# Patient Record
Sex: FEMALE | Race: WHITE | Employment: UNEMPLOYED | URBAN - METROPOLITAN AREA
[De-identification: names, ages, dates, MRNs, and addresses within clinical notes are randomized per-mention and may not be internally consistent; named-entity substitution may affect disease eponyms.]

---

## 2018-04-09 ENCOUNTER — TELEPHONE (OUTPATIENT)
Dept: FAMILY MEDICINE CLINIC | Age: 58
End: 2018-04-09

## 2020-07-08 ENCOUNTER — TELEPHONE (OUTPATIENT)
Dept: NON INVASIVE DIAGNOSTICS | Age: 60
End: 2020-07-08

## 2020-07-08 NOTE — TELEPHONE ENCOUNTER
Spoke with patient to schedule Tilt Test, test is on 07/31/2020 at main with [facility] at 10:30. Patient is to arrive at 9:30, she is to have  both with a mask. Nothing to eat or drink after midnight. No medications to hold morning of test. She is also going to [facility] on 07/27/2020 for COVID testing. She verbalized understanding and instructions also mailed.

## 2020-07-27 ENCOUNTER — HOSPITAL ENCOUNTER (OUTPATIENT)
Age: 60
Discharge: HOME | End: 2020-07-29
Payer: COMMERCIAL

## 2020-07-27 PROCEDURE — U0003 INFECTIOUS AGENT DETECTION BY NUCLEIC ACID (DNA OR RNA); SEVERE ACUTE RESPIRATORY SYNDROME CORONAVIRUS 2 (SARS-COV-2) (CORONAVIRUS DISEASE [COVID-19]), AMPLIFIED PROBE TECHNIQUE, MAKING USE OF HIGH THROUGHPUT TECHNOLOGIES AS DESCRIBED BY CMS-2020-01-R: HCPCS

## 2020-07-28 LAB
SARS-COV-2: NOT DETECTED
SOURCE: NORMAL

## 2020-07-31 ENCOUNTER — HOSPITAL ENCOUNTER (OUTPATIENT)
Dept: CARDIAC CATH/INVASIVE PROCEDURES | Age: 60
Discharge: HOME | End: 2020-07-31
Payer: COMMERCIAL

## 2020-07-31 VITALS
TEMPERATURE: 97.7 F | BODY MASS INDEX: 24.75 KG/M2 | WEIGHT: 145 LBS | OXYGEN SATURATION: 98 % | DIASTOLIC BLOOD PRESSURE: 139 MMHG | HEIGHT: 64 IN | SYSTOLIC BLOOD PRESSURE: 186 MMHG | HEART RATE: 79 BPM

## 2020-07-31 PROCEDURE — 93660 TILT TABLE EVALUATION: CPT | Performed by: INTERNAL MEDICINE

## 2020-07-31 PROCEDURE — 2580000003 HC RX 258: Performed by: INTERNAL MEDICINE

## 2020-07-31 RX ORDER — SODIUM CHLORIDE 9 MG/ML
INJECTION, SOLUTION INTRAVENOUS ONCE
Status: COMPLETED | OUTPATIENT
Start: 2020-07-31 | End: 2020-07-31

## 2020-07-31 RX ORDER — VILAZODONE HYDROCHLORIDE 20 MG/1
20 TABLET ORAL DAILY
COMMUNITY

## 2020-07-31 RX ORDER — FOLIC ACID 1 MG/1
1 TABLET ORAL DAILY
COMMUNITY

## 2020-07-31 RX ADMIN — SODIUM CHLORIDE: 9 INJECTION, SOLUTION INTRAVENOUS at 10:29

## 2020-11-25 ENCOUNTER — APPOINTMENT (OUTPATIENT)
Dept: GENERAL RADIOLOGY | Age: 60
DRG: 563 | End: 2020-11-25
Payer: COMMERCIAL

## 2020-11-25 ENCOUNTER — HOSPITAL ENCOUNTER (INPATIENT)
Age: 60
LOS: 3 days | Discharge: HOME HEALTH | DRG: 563 | End: 2020-11-28
Attending: EMERGENCY MEDICINE | Admitting: SURGERY
Payer: COMMERCIAL

## 2020-11-25 DIAGNOSIS — S82.899A CLOSED FRACTURE OF ANKLE, UNSPECIFIED LATERALITY, INITIAL ENCOUNTER: Primary | ICD-10-CM

## 2020-11-25 PROBLEM — T14.90XA TRAUMA: Status: ACTIVE | Noted: 2020-11-25

## 2020-11-25 LAB
ALBUMIN SERPL-MCNC: 4.5 G/DL (ref 3.5–5.2)
ALP BLD-CCNC: 95 U/L (ref 35–104)
ALT SERPL-CCNC: 14 U/L (ref 0–32)
ANION GAP SERPL CALCULATED.3IONS-SCNC: 12 MMOL/L (ref 7–16)
APTT: 32.9 SEC (ref 24.5–35.1)
AST SERPL-CCNC: 29 U/L (ref 0–31)
BASOPHILS ABSOLUTE: 0.05 E9/L (ref 0–0.2)
BASOPHILS RELATIVE PERCENT: 0.5 % (ref 0–2)
BILIRUB SERPL-MCNC: 1.1 MG/DL (ref 0–1.2)
BUN BLDV-MCNC: 6 MG/DL (ref 8–23)
CALCIUM SERPL-MCNC: 9.4 MG/DL (ref 8.6–10.2)
CHLORIDE BLD-SCNC: 97 MMOL/L (ref 98–107)
CO2: 26 MMOL/L (ref 22–29)
CREAT SERPL-MCNC: 0.6 MG/DL (ref 0.5–1)
EOSINOPHILS ABSOLUTE: 0.02 E9/L (ref 0.05–0.5)
EOSINOPHILS RELATIVE PERCENT: 0.2 % (ref 0–6)
GFR AFRICAN AMERICAN: >60
GFR NON-AFRICAN AMERICAN: >60 ML/MIN/1.73
GLUCOSE BLD-MCNC: 103 MG/DL (ref 74–99)
HCT VFR BLD CALC: 37.6 % (ref 34–48)
HEMOGLOBIN: 12.7 G/DL (ref 11.5–15.5)
IMMATURE GRANULOCYTES #: 0.03 E9/L
IMMATURE GRANULOCYTES %: 0.3 % (ref 0–5)
INR BLD: 1
LYMPHOCYTES ABSOLUTE: 1.71 E9/L (ref 1.5–4)
LYMPHOCYTES RELATIVE PERCENT: 16.1 % (ref 20–42)
MCH RBC QN AUTO: 32.2 PG (ref 26–35)
MCHC RBC AUTO-ENTMCNC: 33.8 % (ref 32–34.5)
MCV RBC AUTO: 95.2 FL (ref 80–99.9)
MONOCYTES ABSOLUTE: 1 E9/L (ref 0.1–0.95)
MONOCYTES RELATIVE PERCENT: 9.4 % (ref 2–12)
NEUTROPHILS ABSOLUTE: 7.8 E9/L (ref 1.8–7.3)
NEUTROPHILS RELATIVE PERCENT: 73.5 % (ref 43–80)
PDW BLD-RTO: 13.4 FL (ref 11.5–15)
PLATELET # BLD: 235 E9/L (ref 130–450)
PMV BLD AUTO: 10.3 FL (ref 7–12)
POTASSIUM REFLEX MAGNESIUM: 3.8 MMOL/L (ref 3.5–5)
PROTHROMBIN TIME: 11.5 SEC (ref 9.3–12.4)
RBC # BLD: 3.95 E12/L (ref 3.5–5.5)
SODIUM BLD-SCNC: 135 MMOL/L (ref 132–146)
TOTAL PROTEIN: 7.1 G/DL (ref 6.4–8.3)
WBC # BLD: 10.6 E9/L (ref 4.5–11.5)

## 2020-11-25 PROCEDURE — 96374 THER/PROPH/DIAG INJ IV PUSH: CPT

## 2020-11-25 PROCEDURE — 99284 EMERGENCY DEPT VISIT MOD MDM: CPT

## 2020-11-25 PROCEDURE — 2580000003 HC RX 258: Performed by: EMERGENCY MEDICINE

## 2020-11-25 PROCEDURE — 73600 X-RAY EXAM OF ANKLE: CPT

## 2020-11-25 PROCEDURE — 73610 X-RAY EXAM OF ANKLE: CPT

## 2020-11-25 PROCEDURE — 1200000000 HC SEMI PRIVATE

## 2020-11-25 PROCEDURE — 73560 X-RAY EXAM OF KNEE 1 OR 2: CPT

## 2020-11-25 PROCEDURE — 85610 PROTHROMBIN TIME: CPT

## 2020-11-25 PROCEDURE — 85730 THROMBOPLASTIN TIME PARTIAL: CPT

## 2020-11-25 PROCEDURE — 99254 IP/OBS CNSLTJ NEW/EST MOD 60: CPT | Performed by: ORTHOPAEDIC SURGERY

## 2020-11-25 PROCEDURE — 96375 TX/PRO/DX INJ NEW DRUG ADDON: CPT

## 2020-11-25 PROCEDURE — 80053 COMPREHEN METABOLIC PANEL: CPT

## 2020-11-25 PROCEDURE — 85025 COMPLETE CBC W/AUTO DIFF WBC: CPT

## 2020-11-25 PROCEDURE — 6360000002 HC RX W HCPCS: Performed by: EMERGENCY MEDICINE

## 2020-11-25 RX ORDER — OXYCODONE HYDROCHLORIDE 10 MG/1
10 TABLET ORAL EVERY 4 HOURS PRN
Status: DISCONTINUED | OUTPATIENT
Start: 2020-11-25 | End: 2020-11-28 | Stop reason: HOSPADM

## 2020-11-25 RX ORDER — SODIUM CHLORIDE 9 MG/ML
1000 INJECTION, SOLUTION INTRAVENOUS CONTINUOUS
Status: DISCONTINUED | OUTPATIENT
Start: 2020-11-25 | End: 2020-11-28 | Stop reason: HOSPADM

## 2020-11-25 RX ORDER — POLYETHYLENE GLYCOL 3350 17 G/17G
17 POWDER, FOR SOLUTION ORAL DAILY PRN
Status: DISCONTINUED | OUTPATIENT
Start: 2020-11-25 | End: 2020-11-26

## 2020-11-25 RX ORDER — ONDANSETRON 4 MG/1
4 TABLET, ORALLY DISINTEGRATING ORAL EVERY 8 HOURS PRN
Status: DISCONTINUED | OUTPATIENT
Start: 2020-11-25 | End: 2020-11-28 | Stop reason: HOSPADM

## 2020-11-25 RX ORDER — ONDANSETRON 2 MG/ML
4 INJECTION INTRAMUSCULAR; INTRAVENOUS EVERY 6 HOURS PRN
Status: DISCONTINUED | OUTPATIENT
Start: 2020-11-25 | End: 2020-11-28 | Stop reason: HOSPADM

## 2020-11-25 RX ORDER — FENTANYL CITRATE 50 UG/ML
50 INJECTION, SOLUTION INTRAMUSCULAR; INTRAVENOUS ONCE
Status: COMPLETED | OUTPATIENT
Start: 2020-11-25 | End: 2020-11-25

## 2020-11-25 RX ORDER — SODIUM CHLORIDE 0.9 % (FLUSH) 0.9 %
10 SYRINGE (ML) INJECTION PRN
Status: DISCONTINUED | OUTPATIENT
Start: 2020-11-25 | End: 2020-11-28 | Stop reason: HOSPADM

## 2020-11-25 RX ORDER — ACETAMINOPHEN 325 MG/1
650 TABLET ORAL EVERY 4 HOURS PRN
Status: DISCONTINUED | OUTPATIENT
Start: 2020-11-25 | End: 2020-11-28 | Stop reason: HOSPADM

## 2020-11-25 RX ORDER — PHENOBARBITAL 32.4 MG/1
97.2 TABLET ORAL 3 TIMES DAILY
Status: DISCONTINUED | OUTPATIENT
Start: 2020-11-26 | End: 2020-11-28 | Stop reason: HOSPADM

## 2020-11-25 RX ORDER — SODIUM CHLORIDE 0.9 % (FLUSH) 0.9 %
10 SYRINGE (ML) INJECTION EVERY 12 HOURS SCHEDULED
Status: DISCONTINUED | OUTPATIENT
Start: 2020-11-26 | End: 2020-11-28 | Stop reason: HOSPADM

## 2020-11-25 RX ORDER — ONDANSETRON 2 MG/ML
4 INJECTION INTRAMUSCULAR; INTRAVENOUS ONCE
Status: COMPLETED | OUTPATIENT
Start: 2020-11-25 | End: 2020-11-25

## 2020-11-25 RX ORDER — OXYCODONE HYDROCHLORIDE 5 MG/1
5 TABLET ORAL EVERY 4 HOURS PRN
Status: DISCONTINUED | OUTPATIENT
Start: 2020-11-25 | End: 2020-11-28 | Stop reason: HOSPADM

## 2020-11-25 RX ADMIN — SODIUM CHLORIDE 1000 ML: 9 INJECTION, SOLUTION INTRAVENOUS at 22:36

## 2020-11-25 RX ADMIN — FENTANYL CITRATE 50 MCG: 50 INJECTION, SOLUTION INTRAMUSCULAR; INTRAVENOUS at 22:37

## 2020-11-25 RX ADMIN — ONDANSETRON HYDROCHLORIDE 4 MG: 2 SOLUTION INTRAMUSCULAR; INTRAVENOUS at 22:44

## 2020-11-25 ASSESSMENT — PAIN DESCRIPTION - LOCATION: LOCATION: ANKLE

## 2020-11-25 ASSESSMENT — PAIN SCALES - GENERAL
PAINLEVEL_OUTOF10: 7
PAINLEVEL_OUTOF10: 8

## 2020-11-25 ASSESSMENT — PAIN DESCRIPTION - ORIENTATION: ORIENTATION: RIGHT;LEFT

## 2020-11-26 LAB
ANION GAP SERPL CALCULATED.3IONS-SCNC: 11 MMOL/L (ref 7–16)
BASOPHILS ABSOLUTE: 0.06 E9/L (ref 0–0.2)
BASOPHILS RELATIVE PERCENT: 0.6 % (ref 0–2)
BUN BLDV-MCNC: 5 MG/DL (ref 8–23)
CALCIUM SERPL-MCNC: 8.7 MG/DL (ref 8.6–10.2)
CHLORIDE BLD-SCNC: 100 MMOL/L (ref 98–107)
CO2: 25 MMOL/L (ref 22–29)
CREAT SERPL-MCNC: 0.6 MG/DL (ref 0.5–1)
EOSINOPHILS ABSOLUTE: 0.08 E9/L (ref 0.05–0.5)
EOSINOPHILS RELATIVE PERCENT: 0.8 % (ref 0–6)
GFR AFRICAN AMERICAN: >60
GFR NON-AFRICAN AMERICAN: >60 ML/MIN/1.73
GLUCOSE BLD-MCNC: 101 MG/DL (ref 74–99)
HCT VFR BLD CALC: 33.9 % (ref 34–48)
HEMOGLOBIN: 11.3 G/DL (ref 11.5–15.5)
IMMATURE GRANULOCYTES #: 0.04 E9/L
IMMATURE GRANULOCYTES %: 0.4 % (ref 0–5)
LYMPHOCYTES ABSOLUTE: 1.32 E9/L (ref 1.5–4)
LYMPHOCYTES RELATIVE PERCENT: 12.6 % (ref 20–42)
MAGNESIUM: 1.3 MG/DL (ref 1.6–2.6)
MCH RBC QN AUTO: 32.3 PG (ref 26–35)
MCHC RBC AUTO-ENTMCNC: 33.3 % (ref 32–34.5)
MCV RBC AUTO: 96.9 FL (ref 80–99.9)
MONOCYTES ABSOLUTE: 1.06 E9/L (ref 0.1–0.95)
MONOCYTES RELATIVE PERCENT: 10.1 % (ref 2–12)
NEUTROPHILS ABSOLUTE: 7.95 E9/L (ref 1.8–7.3)
NEUTROPHILS RELATIVE PERCENT: 75.5 % (ref 43–80)
PDW BLD-RTO: 13.6 FL (ref 11.5–15)
PLATELET # BLD: 194 E9/L (ref 130–450)
PMV BLD AUTO: 10.4 FL (ref 7–12)
POTASSIUM REFLEX MAGNESIUM: 3.5 MMOL/L (ref 3.5–5)
RBC # BLD: 3.5 E12/L (ref 3.5–5.5)
SODIUM BLD-SCNC: 136 MMOL/L (ref 132–146)
WBC # BLD: 10.5 E9/L (ref 4.5–11.5)

## 2020-11-26 PROCEDURE — 6360000002 HC RX W HCPCS: Performed by: SURGERY

## 2020-11-26 PROCEDURE — 97530 THERAPEUTIC ACTIVITIES: CPT

## 2020-11-26 PROCEDURE — 6370000000 HC RX 637 (ALT 250 FOR IP): Performed by: SURGERY

## 2020-11-26 PROCEDURE — 97162 PT EVAL MOD COMPLEX 30 MIN: CPT

## 2020-11-26 PROCEDURE — 6370000000 HC RX 637 (ALT 250 FOR IP): Performed by: STUDENT IN AN ORGANIZED HEALTH CARE EDUCATION/TRAINING PROGRAM

## 2020-11-26 PROCEDURE — 2580000003 HC RX 258: Performed by: STUDENT IN AN ORGANIZED HEALTH CARE EDUCATION/TRAINING PROGRAM

## 2020-11-26 PROCEDURE — 97165 OT EVAL LOW COMPLEX 30 MIN: CPT

## 2020-11-26 PROCEDURE — 80048 BASIC METABOLIC PNL TOTAL CA: CPT

## 2020-11-26 PROCEDURE — 99222 1ST HOSP IP/OBS MODERATE 55: CPT | Performed by: SURGERY

## 2020-11-26 PROCEDURE — 1200000000 HC SEMI PRIVATE

## 2020-11-26 PROCEDURE — 2580000003 HC RX 258: Performed by: EMERGENCY MEDICINE

## 2020-11-26 PROCEDURE — 83735 ASSAY OF MAGNESIUM: CPT

## 2020-11-26 PROCEDURE — 85025 COMPLETE CBC W/AUTO DIFF WBC: CPT

## 2020-11-26 PROCEDURE — 36415 COLL VENOUS BLD VENIPUNCTURE: CPT

## 2020-11-26 RX ORDER — VILAZODONE HYDROCHLORIDE 20 MG/1
20 TABLET ORAL DAILY
Status: DISCONTINUED | OUTPATIENT
Start: 2020-11-26 | End: 2020-11-28 | Stop reason: HOSPADM

## 2020-11-26 RX ORDER — POLYETHYLENE GLYCOL 3350 17 G/17G
17 POWDER, FOR SOLUTION ORAL DAILY
Qty: 527 G | Refills: 1 | Status: SHIPPED | OUTPATIENT
Start: 2020-11-26 | End: 2020-12-26

## 2020-11-26 RX ORDER — FOLIC ACID 1 MG/1
1 TABLET ORAL DAILY
Status: DISCONTINUED | OUTPATIENT
Start: 2020-11-26 | End: 2020-11-28 | Stop reason: HOSPADM

## 2020-11-26 RX ORDER — MAGNESIUM SULFATE IN WATER 40 MG/ML
4 INJECTION, SOLUTION INTRAVENOUS ONCE
Status: COMPLETED | OUTPATIENT
Start: 2020-11-26 | End: 2020-11-26

## 2020-11-26 RX ORDER — METOPROLOL SUCCINATE 25 MG/1
25 TABLET, EXTENDED RELEASE ORAL DAILY
Status: DISCONTINUED | OUTPATIENT
Start: 2020-11-26 | End: 2020-11-28 | Stop reason: HOSPADM

## 2020-11-26 RX ORDER — LOSARTAN POTASSIUM 50 MG/1
100 TABLET ORAL DAILY
Status: DISCONTINUED | OUTPATIENT
Start: 2020-11-26 | End: 2020-11-28 | Stop reason: HOSPADM

## 2020-11-26 RX ORDER — NICOTINE 21 MG/24HR
1 PATCH, TRANSDERMAL 24 HOURS TRANSDERMAL DAILY
Status: DISCONTINUED | OUTPATIENT
Start: 2020-11-27 | End: 2020-11-28 | Stop reason: HOSPADM

## 2020-11-26 RX ORDER — PANTOPRAZOLE SODIUM 40 MG/1
40 TABLET, DELAYED RELEASE ORAL DAILY
Status: DISCONTINUED | OUTPATIENT
Start: 2020-11-26 | End: 2020-11-28 | Stop reason: HOSPADM

## 2020-11-26 RX ORDER — SENNA AND DOCUSATE SODIUM 50; 8.6 MG/1; MG/1
2 TABLET, FILM COATED ORAL DAILY
Qty: 60 TABLET | Refills: 0 | Status: SHIPPED | OUTPATIENT
Start: 2020-11-26

## 2020-11-26 RX ORDER — OXYCODONE HYDROCHLORIDE 5 MG/1
5 TABLET ORAL EVERY 6 HOURS PRN
Qty: 28 TABLET | Refills: 0 | Status: SHIPPED | OUTPATIENT
Start: 2020-11-26 | End: 2020-11-27

## 2020-11-26 RX ORDER — POLYETHYLENE GLYCOL 3350 17 G/17G
17 POWDER, FOR SOLUTION ORAL DAILY
Status: DISCONTINUED | OUTPATIENT
Start: 2020-11-26 | End: 2020-11-28 | Stop reason: HOSPADM

## 2020-11-26 RX ORDER — POTASSIUM CHLORIDE 20 MEQ/1
40 TABLET, EXTENDED RELEASE ORAL
Status: COMPLETED | OUTPATIENT
Start: 2020-11-26 | End: 2020-11-26

## 2020-11-26 RX ORDER — SENNA AND DOCUSATE SODIUM 50; 8.6 MG/1; MG/1
2 TABLET, FILM COATED ORAL DAILY
Status: DISCONTINUED | OUTPATIENT
Start: 2020-11-26 | End: 2020-11-28 | Stop reason: HOSPADM

## 2020-11-26 RX ORDER — ATORVASTATIN CALCIUM 40 MG/1
40 TABLET, FILM COATED ORAL NIGHTLY
Status: DISCONTINUED | OUTPATIENT
Start: 2020-11-26 | End: 2020-11-28 | Stop reason: HOSPADM

## 2020-11-26 RX ORDER — ALPRAZOLAM 1 MG/1
1 TABLET ORAL 4 TIMES DAILY PRN
Status: DISCONTINUED | OUTPATIENT
Start: 2020-11-26 | End: 2020-11-28 | Stop reason: HOSPADM

## 2020-11-26 RX ADMIN — MAGNESIUM SULFATE HEPTAHYDRATE 4 G: 40 INJECTION, SOLUTION INTRAVENOUS at 09:15

## 2020-11-26 RX ADMIN — ENOXAPARIN SODIUM 30 MG: 30 INJECTION SUBCUTANEOUS at 21:27

## 2020-11-26 RX ADMIN — OXYCODONE HYDROCHLORIDE 10 MG: 10 TABLET ORAL at 03:11

## 2020-11-26 RX ADMIN — OXYCODONE HYDROCHLORIDE 10 MG: 10 TABLET ORAL at 21:26

## 2020-11-26 RX ADMIN — LOSARTAN POTASSIUM 100 MG: 50 TABLET, FILM COATED ORAL at 08:43

## 2020-11-26 RX ADMIN — ATORVASTATIN CALCIUM 40 MG: 40 TABLET, FILM COATED ORAL at 21:26

## 2020-11-26 RX ADMIN — ALPRAZOLAM 1 MG: 1 TABLET ORAL at 21:26

## 2020-11-26 RX ADMIN — OXYCODONE HYDROCHLORIDE 10 MG: 10 TABLET ORAL at 08:42

## 2020-11-26 RX ADMIN — POTASSIUM CHLORIDE 40 MEQ: 1500 TABLET, EXTENDED RELEASE ORAL at 08:41

## 2020-11-26 RX ADMIN — DOCUSATE SODIUM 50 MG AND SENNOSIDES 8.6 MG 2 TABLET: 8.6; 5 TABLET, FILM COATED ORAL at 08:42

## 2020-11-26 RX ADMIN — SODIUM CHLORIDE 1000 ML: 9 INJECTION, SOLUTION INTRAVENOUS at 16:50

## 2020-11-26 RX ADMIN — VILAZODONE HYDROCHLORIDE 20 MG: 20 TABLET ORAL at 08:43

## 2020-11-26 RX ADMIN — PHENOBARBITAL 97.2 MG: 32.4 TABLET ORAL at 00:17

## 2020-11-26 RX ADMIN — OXYCODONE HYDROCHLORIDE 10 MG: 10 TABLET ORAL at 12:42

## 2020-11-26 RX ADMIN — OXYCODONE HYDROCHLORIDE 10 MG: 10 TABLET ORAL at 17:20

## 2020-11-26 RX ADMIN — PHENOBARBITAL 97.2 MG: 32.4 TABLET ORAL at 21:26

## 2020-11-26 RX ADMIN — SODIUM CHLORIDE, PRESERVATIVE FREE 10 ML: 5 INJECTION INTRAVENOUS at 08:44

## 2020-11-26 RX ADMIN — FOLIC ACID 1 MG: 1 TABLET ORAL at 08:42

## 2020-11-26 RX ADMIN — PHENOBARBITAL 97.2 MG: 32.4 TABLET ORAL at 14:14

## 2020-11-26 RX ADMIN — METOPROLOL SUCCINATE 25 MG: 25 TABLET, EXTENDED RELEASE ORAL at 08:41

## 2020-11-26 RX ADMIN — ALPRAZOLAM 1 MG: 1 TABLET ORAL at 14:14

## 2020-11-26 RX ADMIN — ENOXAPARIN SODIUM 30 MG: 30 INJECTION SUBCUTANEOUS at 08:41

## 2020-11-26 RX ADMIN — PHENOBARBITAL 97.2 MG: 32.4 TABLET ORAL at 08:42

## 2020-11-26 RX ADMIN — POLYETHYLENE GLYCOL 3350 17 G: 17 POWDER, FOR SOLUTION ORAL at 08:42

## 2020-11-26 RX ADMIN — PANTOPRAZOLE SODIUM 40 MG: 40 TABLET, DELAYED RELEASE ORAL at 08:42

## 2020-11-26 ASSESSMENT — PAIN DESCRIPTION - DESCRIPTORS
DESCRIPTORS: ACHING
DESCRIPTORS: ACHING;CONSTANT;DISCOMFORT

## 2020-11-26 ASSESSMENT — PAIN DESCRIPTION - PROGRESSION
CLINICAL_PROGRESSION: NOT CHANGED

## 2020-11-26 ASSESSMENT — PAIN SCALES - GENERAL
PAINLEVEL_OUTOF10: 0
PAINLEVEL_OUTOF10: 10
PAINLEVEL_OUTOF10: 8
PAINLEVEL_OUTOF10: 0
PAINLEVEL_OUTOF10: 7
PAINLEVEL_OUTOF10: 10
PAINLEVEL_OUTOF10: 9
PAINLEVEL_OUTOF10: 8
PAINLEVEL_OUTOF10: 9

## 2020-11-26 ASSESSMENT — PAIN DESCRIPTION - PAIN TYPE
TYPE: ACUTE PAIN

## 2020-11-26 ASSESSMENT — PAIN DESCRIPTION - LOCATION
LOCATION: ANKLE

## 2020-11-26 ASSESSMENT — PAIN - FUNCTIONAL ASSESSMENT
PAIN_FUNCTIONAL_ASSESSMENT: PREVENTS OR INTERFERES SOME ACTIVE ACTIVITIES AND ADLS
PAIN_FUNCTIONAL_ASSESSMENT: PREVENTS OR INTERFERES SOME ACTIVE ACTIVITIES AND ADLS
PAIN_FUNCTIONAL_ASSESSMENT: PREVENTS OR INTERFERES WITH MANY ACTIVE NOT PASSIVE ACTIVITIES

## 2020-11-26 ASSESSMENT — PAIN DESCRIPTION - ONSET
ONSET: ON-GOING

## 2020-11-26 ASSESSMENT — PAIN DESCRIPTION - ORIENTATION
ORIENTATION: RIGHT;LEFT

## 2020-11-26 ASSESSMENT — PAIN DESCRIPTION - FREQUENCY
FREQUENCY: CONTINUOUS

## 2020-11-26 NOTE — H&P
TRAUMA HISTORY & PHYSICAL  Surgical Resident/Advance Practice Nurse  11/25/2020  10:32 PM    PRIMARY SURVEY    CHIEF COMPLAINT:  Trauma consult. Injury occurred yesterday evening. Pt was drinking alcohol and fell down 10-12 steps. She struck her head but did no lose consciousness. She reports getting up and going back up the stairs to bed. This morning she could not get out of bed and reports having bilateral ankle pain, right knee pain, and right shoulder pain.     AIRWAY:   Airway Normal  EMS ETT Absent  Noisy respirations Absent  Retractions: Absent  Vomiting/bleeding: Absent      BREATHING:    Midaxillary breath sound left:  Normal  Midaxillary breath sound right:  Normal    Cough sound intensity:  good   FiO2: None  SMI None      CIRCULATION:   Femerol pulse intensity: Strong  Palpebral conjunctiva: Pink       Vitals:    11/25/20 2116   BP: (!) 178/127   Pulse: 77   Resp: 16   Temp: 97.5 °F (36.4 °C)   SpO2: 97%       Vitals:    11/25/20 2116   BP: (!) 178/127   Pulse: 77   Resp: 16   Temp: 97.5 °F (36.4 °C)   SpO2: 97%   Weight: 143 lb (64.9 kg)   Height: 5' 4\" (1.626 m)        FAST EXAM: Deferred    Central Nervous System    GCS Initial 15 minutes   Eye  Motor  Verbal 4 - Opens eyes on own  6 - Follows simple motor commands  5 - Alert and oriented 4 - Opens eyes on own  6 - Follows simple motor commands  5 - Alert and oriented     Neuromuscular blockade: No  Pupil size:  Left 3 mm    Right 3 mm  Pupil reaction: Yes    Wiggles fingers: Left Yes Right Yes  Wiggles toes: Left Yes   Right Yes    Hand grasp:   Left  Present      Right  Present  Plantar flexion: Left  Weak      Right   Weak    Loss of consciousness:  No  History Obtained From:  Patient & EMS  Private Medical Doctor: Unknown    Pre-exisiting Medical History:  yes    Conditions: Anxiety, Neuropathy, HTN, HLD, CAD, Chronic back pain    Medications: Metoprolol, Simvastatin, Losartan, Xanax, ASA 81mg    Allergies: Codeine    Social History:   Tobacco use:  1 PPD smoking history  Alcohol use:  Drinks 3-4 glasses of wine, 3-4 days per week  Illicit drug use:  no history of illicit drug use    Past Surgical History:  Appendectomy, cholecystectomy    Anticoagulant use: Yes ASA 81mg daily  Antiplatelet use: Yes ASA 81mg daily    NSAID use in last 72 hours: no  Taken PCN in past:  yes  Last food/drink: This morning  Last tetanus: Unknown    Family History:   Not pertinent to presenting problem. Complaints:   Head:  None  Neck:   None  Chest:   None  Back:   Mild  Abdomen:   None  Extremities:   Moderate  Comments: Bilateral ankle pain, right knee pain, right shoulder pain. Chronic lower back pain. Review of systems:  All negative unless otherwise noted. SECONDARY SURVEY  Head/scalp: Atraumatic    Face: Atraumatic    Eyes/ears/nose: Atraumatic    Pharynx/mouth: Atraumatic    Neck: Atraumatic     Cervical spine tenderness:   Cervical collar not indicated  Pain:  none  ROM:  Not indicated    Chest wall:  Atraumatic    Heart:  Regular rate & rhythm    Abdomen: Atraumatic. Soft ND  Tenderness:  none    Pelvis: Atraumatic  Tenderness: none    Thoracolumbar spine: Atraumatic  Tenderness:  Chronic lower back pain, unchanged    Genitourinary:  Atraumatic. No blood or urine noted    Rectum: Atraumatic. No blood noted. Perineum: Atraumatic. No blood or urine noted. Extremities:   Sensory normal  Motor Weak plantarflexion and dorsiflexion bilaterally    Distal Pulses  Left arm normal  Right arm normal  Left leg normal  Right leg normal    Capillary refill  Left arm normal  Right arm normal  Left leg normal  Right leg normal    Procedures in ED:  None    In the event of Emergency Blood Transfusion:  Due to the critical condition of this patient, I request the immediate release of blood products for emergency transfusion secondary to shock. I understand the increased risks incurred by the lack of complete transfusion testing.       Radiology: Scans from [place], CT head and cervical spine, Bilateral ankle x-ray showing bilateral fractures. Chest and pelvic x-ray negative. Consultations:  Orthopedic surgery    Admission/Diagnosis: Trauma transfer from [place] after fall.  Bilateral ankle fractures and right scapula fracture    Plan of Treatment:  - Ortho recs walking boots bilaterally, right knee immobilizer, and right sling  - F/U outpatient with ortho trauma in 2 weeks  - Pain control PRN  - Okay to DC home    Plan discussed with Dr. Adrien Chilel at 11/25/2020 on 10:32 PM     Electronically signed by Isaura Kessler MD on 11/25/2020 at 10:32 PM

## 2020-11-26 NOTE — ED PROVIDER NOTES
HPI:  20,   Time: 9:50 PM BATOOL Meza is a 56 y.o. female presenting to the ED for fall, beginning 1 day ago. The complaint has been persistent, moderate in severity, and worsened by nothing. Transfer from [facility], fall last night, down 10-12 stairs, seen at o hs today, found to have lionel ankle fx, right scapula fx, sent for trauma eval.  C/o pain all over. No loc, no n/v.       Review of Systems:   Pertinent positives and negatives are stated within HPI, all other systems reviewed and are negative.          --------------------------------------------- PAST HISTORY ---------------------------------------------  Past Medical History:  has a past medical history of Alcohol abuse, unspecified, Allergic rhinitis, cause unspecified, Anxiety disorder in conditions classified elsewhere, Anxiety state, unspecified, Atrial fibrillation ([facility] [address] Chronic airway obstruction, not elsewhere classified, Chronic interstitial cystitis, Degeneration of lumbar or lumbosacral intervertebral disc, Depressive disorder, not elsewhere classified, Essential hypertension, benign, Irritable bowel syndrome, Tobacco use disorder, and Unspecified vitamin D deficiency. Past Surgical History:  has a past surgical history that includes  section; Appendectomy; Breast surgery; Knee arthroscopy; Cholecystectomy; Neck surgery; Cardiac catheterization; and other surgical history (2020). Social History:  reports that she has been smoking. She has been smoking about 1.00 pack per day. She has never used smokeless tobacco. She reports that she does not drink alcohol or use drugs. Family History: family history includes Cancer in her father; Heart Disease in her father and mother; High Blood Pressure in her father and mother; Other in her mother; Thyroid Disease in her mother. The patients home medications have been reviewed.     Allergies: 11.5 - 15.0 fL    Platelets 716 617 - 878 E9/L    MPV 10.3 7.0 - 12.0 fL    Neutrophils % 73.5 43.0 - 80.0 %    Immature Granulocytes % 0.3 0.0 - 5.0 %    Lymphocytes % 16.1 (L) 20.0 - 42.0 %    Monocytes % 9.4 2.0 - 12.0 %    Eosinophils % 0.2 0.0 - 6.0 %    Basophils % 0.5 0.0 - 2.0 %    Neutrophils Absolute 7.80 (H) 1.80 - 7.30 E9/L    Immature Granulocytes # 0.03 E9/L    Lymphocytes Absolute 1.71 1.50 - 4.00 E9/L    Monocytes Absolute 1.00 (H) 0.10 - 0.95 E9/L    Eosinophils Absolute 0.02 (L) 0.05 - 0.50 E9/L    Basophils Absolute 0.05 0.00 - 0.20 E9/L   Comprehensive Metabolic Panel w/ Reflex to MG   Result Value Ref Range    Sodium 135 132 - 146 mmol/L    Potassium reflex Magnesium 3.8 3.5 - 5.0 mmol/L    Chloride 97 (L) 98 - 107 mmol/L    CO2 26 22 - 29 mmol/L    Anion Gap 12 7 - 16 mmol/L    Glucose 103 (H) 74 - 99 mg/dL    BUN 6 (L) 8 - 23 mg/dL    CREATININE 0.6 0.5 - 1.0 mg/dL    GFR Non-African American >60 >=60 mL/min/1.73    GFR African American >60     Calcium 9.4 8.6 - 10.2 mg/dL    Total Protein 7.1 6.4 - 8.3 g/dL    Alb 4.5 3.5 - 5.2 g/dL    Total Bilirubin 1.1 0.0 - 1.2 mg/dL    Alkaline Phosphatase 95 35 - 104 U/L    ALT 14 0 - 32 U/L    AST 29 0 - 31 U/L   Protime-INR   Result Value Ref Range    Protime 11.5 9.3 - 12.4 sec    INR 1.0    APTT   Result Value Ref Range    aPTT 32.9 24.5 - 35.1 sec   Basic Metabolic Panel w/ Reflex to MG   Result Value Ref Range    Sodium 136 132 - 146 mmol/L    Potassium reflex Magnesium 3.5 3.5 - 5.0 mmol/L    Chloride 100 98 - 107 mmol/L    CO2 25 22 - 29 mmol/L    Anion Gap 11 7 - 16 mmol/L    Glucose 101 (H) 74 - 99 mg/dL    BUN 5 (L) 8 - 23 mg/dL    CREATININE 0.6 0.5 - 1.0 mg/dL    GFR Non-African American >60 >=60 mL/min/1.73    GFR African American >60     Calcium 8.7 8.6 - 10.2 mg/dL   CBC auto differential   Result Value Ref Range    WBC 10.5 4.5 - 11.5 E9/L    RBC 3.50 3.50 - 5.50 E12/L    Hemoglobin 11.3 (L) 11.5 - 15.5 g/dL    Hematocrit 33.9 (L) 34.0 - 48.0 %    MCV 96.9 80.0 - 99.9 fL    MCH 32.3 26.0 - 35.0 pg    MCHC 33.3 32.0 - 34.5 %    RDW 13.6 11.5 - 15.0 fL    Platelets 244 872 - 782 E9/L    MPV 10.4 7.0 - 12.0 fL    Neutrophils % 75.5 43.0 - 80.0 %    Immature Granulocytes % 0.4 0.0 - 5.0 %    Lymphocytes % 12.6 (L) 20.0 - 42.0 %    Monocytes % 10.1 2.0 - 12.0 %    Eosinophils % 0.8 0.0 - 6.0 %    Basophils % 0.6 0.0 - 2.0 %    Neutrophils Absolute 7.95 (H) 1.80 - 7.30 E9/L    Immature Granulocytes # 0.04 E9/L    Lymphocytes Absolute 1.32 (L) 1.50 - 4.00 E9/L    Monocytes Absolute 1.06 (H) 0.10 - 0.95 E9/L    Eosinophils Absolute 0.08 0.05 - 0.50 E9/L    Basophils Absolute 0.06 0.00 - 0.20 E9/L   Magnesium   Result Value Ref Range    Magnesium 1.3 (L) 1.6 - 2.6 mg/dL   Basic Metabolic Panel w/ Reflex to MG   Result Value Ref Range    Sodium 137 132 - 146 mmol/L    Potassium reflex Magnesium 3.9 3.5 - 5.0 mmol/L    Chloride 102 98 - 107 mmol/L    CO2 26 22 - 29 mmol/L    Anion Gap 9 7 - 16 mmol/L    Glucose 107 (H) 74 - 99 mg/dL    BUN 3 (L) 8 - 23 mg/dL    CREATININE 0.5 0.5 - 1.0 mg/dL    GFR Non-African American >60 >=60 mL/min/1.73    GFR African American >60     Calcium 8.3 (L) 8.6 - 10.2 mg/dL   CBC auto differential   Result Value Ref Range    WBC 7.2 4.5 - 11.5 E9/L    RBC 2.99 (L) 3.50 - 5.50 E12/L    Hemoglobin 9.6 (L) 11.5 - 15.5 g/dL    Hematocrit 29.2 (L) 34.0 - 48.0 %    MCV 97.7 80.0 - 99.9 fL    MCH 32.1 26.0 - 35.0 pg    MCHC 32.9 32.0 - 34.5 %    RDW 13.4 11.5 - 15.0 fL    Platelets 628 161 - 160 E9/L    MPV 10.2 7.0 - 12.0 fL    Neutrophils % 62.6 43.0 - 80.0 %    Immature Granulocytes % 0.3 0.0 - 5.0 %    Lymphocytes % 19.4 (L) 20.0 - 42.0 %    Monocytes % 14.3 (H) 2.0 - 12.0 %    Eosinophils % 2.6 0.0 - 6.0 %    Basophils % 0.8 0.0 - 2.0 %    Neutrophils Absolute 4.50 1.80 - 7.30 E9/L    Immature Granulocytes # 0.02 E9/L    Lymphocytes Absolute 1.40 (L) 1.50 - 4.00 E9/L    Monocytes Absolute 1.03 (H) 0.10 - 0.95 E9/L    Eosinophils Absolute 0.19 0.05 - 0.50 E9/L    Basophils Absolute 0.06 0.00 - 0.20 B0/O   Basic Metabolic Panel w/ Reflex to MG   Result Value Ref Range    Sodium 138 132 - 146 mmol/L    Potassium reflex Magnesium 3.6 3.5 - 5.0 mmol/L    Chloride 103 98 - 107 mmol/L    CO2 24 22 - 29 mmol/L    Anion Gap 11 7 - 16 mmol/L    Glucose 100 (H) 74 - 99 mg/dL    BUN 3 (L) 8 - 23 mg/dL    CREATININE 0.5 0.5 - 1.0 mg/dL    GFR Non-African American >60 >=60 mL/min/1.73    GFR African American >60     Calcium 8.7 8.6 - 10.2 mg/dL   CBC auto differential   Result Value Ref Range    WBC 6.8 4.5 - 11.5 E9/L    RBC 2.86 (L) 3.50 - 5.50 E12/L    Hemoglobin 9.4 (L) 11.5 - 15.5 g/dL    Hematocrit 27.9 (L) 34.0 - 48.0 %    MCV 97.6 80.0 - 99.9 fL    MCH 32.9 26.0 - 35.0 pg    MCHC 33.7 32.0 - 34.5 %    RDW 13.2 11.5 - 15.0 fL    Platelets 874 150 - 156 E9/L    MPV 10.9 7.0 - 12.0 fL    Neutrophils % 59.8 43.0 - 80.0 %    Immature Granulocytes % 0.3 0.0 - 5.0 %    Lymphocytes % 22.0 20.0 - 42.0 %    Monocytes % 13.5 (H) 2.0 - 12.0 %    Eosinophils % 3.5 0.0 - 6.0 %    Basophils % 0.9 0.0 - 2.0 %    Neutrophils Absolute 4.09 1.80 - 7.30 E9/L    Immature Granulocytes # 0.02 E9/L    Lymphocytes Absolute 1.50 1.50 - 4.00 E9/L    Monocytes Absolute 0.92 0.10 - 0.95 E9/L    Eosinophils Absolute 0.24 0.05 - 0.50 E9/L    Basophils Absolute 0.06 0.00 - 0.20 E9/L       RADIOLOGY:  Interpreted by Radiologist.  XR KNEE RIGHT (1-2 VIEWS)   Final Result   Moderate joint effusion. Degenerative changes. XR ANKLE RIGHT (2 VIEWS)   Final Result   Anatomic alignment on single stress view of the right ankle. Lucency along the medial malleolus consistent with nondisplaced avulsion   injury of undetermined age. Evaluation limited based on the single view obtained. XR ANKLE LEFT (2 VIEWS)   Final Result   Single stress view of the left ankle shows mild widening of the medial   tibiofemoral joint space which may reflect ligamentous disruption.    Lucency along the medial malleolus suggests nondisplaced avulsion injury. Evaluation is limited based on the single view obtained. Correlation with complete ankle series would be helpful. XR SHOULDER RIGHT (MIN 2 VIEWS)    (Results Pending)   XR SCAPULA RIGHT (COMPLETE)    (Results Pending)       EKG: This EKG is signed and interpreted by the EP. Time:   Rate:   Rhythm:   Interpretation:   Comparison:       ------------------------- NURSING NOTES AND VITALS REVIEWED ---------------------------   The nursing notes within the ED encounter and vital signs as below have been reviewed by myself. BP (!) 157/77   Pulse 89   Temp 97.3 °F (36.3 °C) (Temporal)   Resp 18   Ht 5' 4\" (1.626 m)   Wt 143 lb (64.9 kg)   SpO2 98%   BMI 24.55 kg/m²   Oxygen Saturation Interpretation: Normal    The patients available past medical records and past encounters were reviewed. ------------------------------ ED COURSE/MEDICAL DECISION MAKING----------------------  Medications   fentaNYL (SUBLIMAZE) injection 50 mcg (50 mcg Intravenous Given 11/25/20 2237)   ondansetron (ZOFRAN) injection 4 mg (4 mg Intravenous Given 11/25/20 2244)   magnesium sulfate 4 g in 100 mL IVPB premix (0 g Intravenous Stopped 11/26/20 1315)   potassium chloride (KLOR-CON M) extended release tablet 40 mEq (40 mEq Oral Given 11/26/20 0841)         ED COURSE:       Medical Decision Making:    Ortho and trauma eval in ed, will admit for further care      This patient's ED course included: a personal history and physicial examination    This patient has remained hemodynamically stable during their ED course. Re-Evaluations:             Re-evaluation.   Patients symptoms are improving    Re-examination  11/25/20   10:50 PM EST          Vital Signs:   Vitals:    11/27/20 1515 11/28/20 0134 11/28/20 0800 11/28/20 1515   BP: (!) 152/73 (!) 187/85 (!) 204/91 (!) 157/77   Pulse: 78 77 79 89   Resp: 16 18 18 18   Temp: 97.6 °F (36.4 °C) 98.3 °F (36.8 °C) 97.7 °F (36.5 °C) 97.3 °F (36.3 °C)   TempSrc: Temporal Temporal Temporal Temporal   SpO2: 95% 96% 97% 98%   Weight:       Height:         Card/Pulm:  Rhythm: normal rate. Heart Sounds: no murmurs, gallops, or rubs. clear to auscultation, no wheezes or rales and unlabored breathing. Capillary Refill: normal.  Radial Pulse:  equal.  Skin:  Warm. Consultations:             Ortho  trauma    Critical Care:         Counseling: The emergency provider has spoken with the patient and discussed todays results, in addition to providing specific details for the plan of care and counseling regarding the diagnosis and prognosis. Questions are answered at this time and they are agreeable with the plan.       --------------------------------- IMPRESSION AND DISPOSITION ---------------------------------    IMPRESSION  1. Closed fracture of ankle, unspecified laterality, initial encounter        DISPOSITION  Disposition: Admit to med/surg floor  Patient condition is stable    NOTE: This report was transcribed using voice recognition software.  Every effort was made to ensure accuracy; however, inadvertent computerized transcription errors may be present        Aisha Davalos MD  11/26/20 [address] [place] [address] [address], MD  12/01/20 7042

## 2020-11-26 NOTE — PROGRESS NOTES
Department of Orthopedic Surgery  Resident Progress Note    Patient seen and examined. Pain controlled. No new complaints. Denies chest pain, shortness of breath, dizziness/lightheadedness.     VITALS:  BP (!) 181/85   Pulse 88   Temp 98.3 °F (36.8 °C) (Temporal)   Resp 18   Ht 5' 4\" (1.626 m)   Wt 143 lb (64.9 kg)   SpO2 92%   BMI 24.55 kg/m²     General: alert and oriented to person, place and time, well-developed and well-nourished, in no acute distress    MUSCULOSKELETAL:   bilateral lower extremity:  · Dressing C/D/I  · Continued right knee effusion present   · Compartments soft and compressible  · +PF/DF/EHL  · +2/4 DP & PT pulses, Brisk Cap refill, Toes warm and perfused  · Distal sensation grossly intact to Peroneals, Sural, Saphenous, and tibial nrs    Right knee:  · Dressing C/D/I  · Continued edema with moderate joint effusion  · Patient able to tolerate knee range of motion 0 to 90 degrees  · + TTP diffusely about the knee joint      CBC:   Lab Results   Component Value Date    WBC 10.6 11/25/2020    HGB 12.7 11/25/2020    HCT 37.6 11/25/2020     11/25/2020     PT/INR:    Lab Results   Component Value Date    PROTIME 11.5 11/25/2020    INR 1.0 11/25/2020       ASSESSMENT  · Left medial malleolus fracture  · Right medial malleolus fracture  · Right knee strain with effusion -possible ACL tear  · Right coracoid fracture    PLAN      · Weightbearing as tolerated right lower extremity with knee immobilizer and walking boot  · Weightbearing as tolerated left lower extremity with walking boot  · Weightbearing as tolerated right upper extremity  · No heavy lifting with right upper extremity  · Sling for comfort right upper extremity  · Recommend crutches to help with ambulation  · Recommend PT/OT  · Pain control per admitting  · DVT prophylaxis per admitting  · Patient okay to discharge from orthopedic standpoint once she is able to ambulate and perform ADLs safely - orthopedics will follow peripherally  · Follow-up with Dr. Angy Chapman in 2 weeks  · Plan to be discussed with attending

## 2020-11-26 NOTE — PROGRESS NOTES
Occupational Therapy  OCCUPATIONAL THERAPY INITIAL EVALUATION      Date:2020  Patient Name: Ro Ch  MRN: [id number]  : 1960  Room: [address] [place] [address]-A    Referring Provider: Yandel Amaro MD      Evaluating OT: Ana Aragon OTR/L; [id number]    AM-PAC Daily Activity Raw Score:     Recommended Adaptive Equipment:  TBD     Diagnosis: Trauma Fall Down Steps                       Right Medial Malleolus Fracture                        Left Medial Malleolus Fracture                        RUE Coracoid Fracture                        R Knee Strain - possible ACL Tear      Surgery: none      Past Medical History:   Diagnosis Date    Alcohol abuse, unspecified 2015    Allergic rhinitis, cause unspecified 2015    Anxiety disorder in conditions classified elsewhere 2011    Anxiety state, unspecified 2013    [facility] 10/20/2011    Chronic airway obstruction, not elsewhere classified 2011    Chronic interstitial cystitis 2012    Degeneration of lumbar or lumbosacral intervertebral disc 2011    Depressive disorder, not elsewhere classified 2014    Essential hypertension, benign 2011    Irritable bowel syndrome 2013    Tobacco use disorder 2015    Unspecified vitamin D deficiency 10/29/2014      Precautions:  Falls, WBAT RLE with Knee Immobilizer and walking boot , WBAT LLE with walking boot , NWB RUE, Sling for comfort to [address]: Pt lives with  in a 2 story home with 3 steps to enter and one grab rail .  Bed and bath on 2nd floor   Bathroom setup: tub/shower combo    Equipment owned: none     Prior Level of Function: pt was independent  with ADLs , independent  with IADLs; ambulated with no device   Driving: pt does drive   Occupation: pt is not currently working     Pain Level: all over pain   Cognition: A&O: 4/4; Follows multi  step directions   Memory:  Good    Sequencing:  Good    Problem solving:  Good    Judgement/safety: Fair - verbal cues to follow NWB of RUE      Functional Assessment:   Initial Eval Status  Date: 11/26/20 Treatment Status  Date: STGs = LTGs  Time frame: 5-7 days   Feeding Set up   Independent    Grooming Minimal Assist   Supervision    UB Dressing Minimal Assist   Supervision    LB Dressing Maximal Assist   Minimal Assist with AE   Bathing N/A       Toileting Maximal Assist - assist to get on and off bed pan   Minimal Assist    Bed Mobility  Supine to sit: Minimal Assist   Sit to supine: Minimal Assist   Supine to sit: Independent   Sit to supine: Independent    Functional Transfers Maximal Assist - sit to stand edge of bed with [employer]   Minimal Assist with appropriate assistive device    Functional Mobility Maximal Assist a few steps with SBQC   Minimal Assist with appropriate assistive device    Balance Sitting:     Static:  SBA     Dynamic:Min A   Standing: Max A      Activity Tolerance Fair      Visual/  Perceptual Glasses: for reading                 Hand Dominance left    Strength ROM Additional Info:    RUE  4-/5 distal strength only tested  WFL distal only  good  and wfl FMC/dexterity noted during ADL tasks       LUE 4/5  WFL good  and wfl FMC/dexterity noted during ADL tasks       Hearing: WFL   Sensation:  No c/o numbness or tingling   Tone: WFL   Edema: none in BUE's                               Comments: Upon arrival patient was supine in bed and agreeable to OT eval.At end of session, patient was returned to supine in bed  with call light and phone within reach, all lines and tubes intact. Overall patient demonstrated decreased independence and safety during completion of ADL/functional transfer/mobility tasks. Pt would benefit from continued skilled OT to increase safety and independence with completion of ADL/IADL tasks for functional independence and quality of life.     Treatment: OT treatment provided this date includes:    Instruction/training on safety and adapted techniques for completion of ADLs:     Instruction/training on safe functional mobility/transfer techniques:     Instruction/training on energy conservation/work simplification for completion of ADLs: Beau Potter  Neuromuscular Reeducation to facilitate balance/righting reactions for increased function with ADLs tasks:       Eval Complexity: Low     Assessment of current deficits   Functional mobility [x]  ADLs [x] Strength [x]  Cognition []  Functional transfers  [x] IADLs [x] Safety Awareness [x]  Endurance [x]  Fine Motor Coordination [] Balance [x] Vision/perception [] Sensation []   Gross Motor Coordination [] ROM [] Delirium []                  Motor Control []    Plan of Care:  OT 1-3x/week PRN   ADL retraining [x]   Equipment needs [x]   Neuromuscular re-education [x] Energy Conservation Techniques [x]  Functional Transfer training [x] Patient and/or Family Education [x]  Functional Mobility training [x]  Environmental Modifications [x]  Cognitive re-training []   Compensatory techniques for ADLs [x]  Splinting Needs []   Positioning to improve overall function [x]   Therapeutic Activity [x]  Therapeutic Exercise  [x]  Visual/Perceptual: []    Delirium prevention/treatment  []   Other:  []    Rehab Potential: Good for established goals     Patient / Family Goal: be able to take care of myself     Patient and/or family were instructed on functional diagnosis, prognosis/goals and OT plan of care. Demonstrated good  understanding.      Evaluation (+) 15 mins     Treatment Time In: 1005          Treatment Time Out: 1020                Treatment Charges: Mins Units   Ther Ex  31704     Manual Therapy 51002     Thera Activities 74004 15 1   ADL/Home Mgt 59919     Neuro Re-ed 56501     Group Therapy      Orthotic manage/training  [id number]     Non-Billable Time     Total Timed Treatment 15 1       Evaluation time includes thorough review of current medical information, gathering information on past medical history/social history and prior level of function, completion of standardized testing/informal observation of tasks, assessment of data, and development of POC/Goals      Sergio Ibarra OTR/L; [id number]

## 2020-11-26 NOTE — PROGRESS NOTES
Trauma Tertiary Survey    Admit Date: 11/25/2020    Other - fall from 12 stairs, etoh    CC:    Chief Complaint   Patient presents with    Fall     ([address] from [place], Bilateral ankle FX, R clavicle FX) 4morphine given at 19:54, Vicodin 16:40       Alcohol pre-screening:  How many times in the past year have you had 4-5 or more drinks in a day? Occasionally has up to 4 drinks, not more    Subjective:       No new pains. Still with left posterolateral neck soreness, R scapula, bilateral ribs, R knee, and bilateral ankles pain. Objective:     Patient Vitals for the past 8 hrs:   BP Temp Temp src Pulse Resp SpO2   11/26/20 0255 (!) 181/85 98.3 °F (36.8 °C) Temporal 88 18 92 %   11/26/20 0027 (!) 182/102 98 °F (36.7 °C) -- 87 18 96 %   11/26/20 0015 (!) 206/112 -- -- -- -- 96 %   11/26/20 0000 (!) 164/93 -- -- 90 18 --       No intake/output data recorded. No intake/output data recorded. Radiology:  XR KNEE RIGHT (1-2 VIEWS)   Final Result   Moderate joint effusion. Degenerative changes. XR ANKLE RIGHT (2 VIEWS)   Final Result   Anatomic alignment on single stress view of the right ankle. Lucency along the medial malleolus consistent with nondisplaced avulsion   injury of undetermined age. Evaluation limited based on the single view obtained. XR ANKLE LEFT (2 VIEWS)   Final Result   Single stress view of the left ankle shows mild widening of the medial   tibiofemoral joint space which may reflect ligamentous disruption. Lucency along the medial malleolus suggests nondisplaced avulsion injury. Evaluation is limited based on the single view obtained. Correlation with complete ankle series would be helpful.       XR SHOULDER RIGHT (MIN 2 VIEWS)    (Results Pending)   XR SCAPULA RIGHT (COMPLETE)    (Results Pending)       PHYSICAL EXAM:   GCS:  4 - Opens eyes on own   6 - Follows simple motor commands  5 - Alert and oriented    Pupil size: Left 4 mm     Right 4 mm  Pupil reaction: Yes  Wiggles fingers: Left Yes     Right Yes  Wiggles toes: Left Yes     Right Yes  Plantar flexion: Left normal     Right normal    GENERAL:  NAD. A&Ox3. HEAD:  Normocephalic, atraumatic. LUNGS:  No increased work of breathing. No cough. [address]. Mild bilateral tenderness of ribcage. CARDIOVASCULAR: Normal rate, regular rhythm. ABDOMEN:  Soft, non-distended, non-tender. No guarding, rigidity, rebound. EXTREMITIES:  MAEx4. No LE edema. BLE wrapped in ACE. SKIN:  Warm and dry, ecchymosis over R posterior shoulder and bilateral ankles      Spine:       Spine Tenderness ROM   Cervical 0 /10 (mild left posterolateral) Normal   Thoracic 0 /10 Normal   Lumbar 0 /10 Normal     Musculoskeletal:    Joint Tenderness Swelling/Deformity ROM   Right shoulder Posteriorly scapula Mild hematoma  normal   Left shoulder absent absent normal   Right elbow absent absent normal   Left elbow absent absent normal   Right wrist absent absent normal   Left wrist absent absent normal   Right hand grasp absent absent normal   Left hand grasp absent absent normal   Right hip absent absent normal   Left hip absent absent normal   Right knee yes absent Not tested 2/2 concern for ACL injury per ortho   Left knee absent absent normal   Right ankle yes absent Not tested 2/2 fx   Left ankle yes absent Not tested 2/2 fx   Right foot absent absent Not tested 2/2 fx   Left foot absent absent Not tested 2/2 fx         CONSULTS: ortho      Active Problems:    Trauma  Resolved Problems:    * No resolved hospital problems. *        Assessment/Plan:     Neuro:  No acute issues. GCS 15. Pain control. SBI for alcohol. Phenobarb protocol. CV: No acute issues. Pulm: No acute issues. Encourage IS/SMI. GI: No acute issues. Bowel regimen. Zofran PRN. Renal: No acute issues. Replace lytes (Mg, K)  Endocrine: No acute issues.   MSK: R scapula, R possible ACL injury, bilateral medial maleolar fx, needs R knee immobilizer & NWB w/ boot per ortho, ok for WBAT LLE with boot per ortho, needs crutches. Ok to [employer] home per ortho if she can walk. PT/OT. Heme: No acute issues. ID: No acute issues. Pain/Analgesia: tylenol, oxy  Bowel Regimen: pericolace, miralax  DVT PPx:  SCDs, lmwh  GI PPx:  none    Code status:  Full Code    Disposition:  Possible home today if ambulatory with boots/crutches. PTOT. SBI. Veronica Carter MD  11/26/2020  6:42 AM     Patient seen and examined, agree with resident note, for remaining HP/Consult details please see resident HP/Consult note. Patient seen and examined I agree with above     CC: fall down steps while drinking    S:feels ok this morning, just R shoulder pain and b/l ankle pain     GEN NAD   HEENT: PERRL 3mm  Atraumatic   Resp non labored clear b/l to auscultation   CVS RR no extra heart sounds   ABD SNT   EXT NVI ROM wnl B/L uppers, b/l LE NVI, ecchymosis and swelling b/l ankles moderate tenderness medial mal.  R knee tenderness     A/P - s/p fall down steps with R coracoid fx, R knee strain, b/l medial mal fx,     - Ortho recs appreciated, non op mgmt   - ETOH abuse, pnb   - pain control   - home meds  - PTOT and d/c planning.           Ayanna Amor MD

## 2020-11-26 NOTE — CONSULTS
Department of Orthopedic Surgery  Resident Consult Note          Reason for Consult: Fall    HISTORY OF PRESENT ILLNESS:       Patient is a 56 y.o. female who presents with complaints of right shoulder and bilateral ankle pain after a fall yesterday. Patient reports drinking alcohol and falling down approximately 16 steps yesterday evening. Patient did hit her head but denies LOC. She takes a baby aspirin daily. Patient went to bed and woke up this morning with extreme right shoulder and bilateral ankle pain. Patient was not able to ambulate and presented to outside facility for evaluation. Patient was transferred to [facility] ED for higher level of care. Patient currently complains of bilateral ankle pain and right shoulder pain. Denies numbness/tingling/paresthesias. Denies any other orthopedic complaints at this time.       Past Medical History:        Diagnosis Date    Alcohol abuse, unspecified 2015    Allergic rhinitis, cause unspecified 2015    Anxiety disorder in conditions classified elsewhere 2011    Anxiety state, unspecified 2013    [facility] ([facility] 10/20/2011    Chronic airway obstruction, not elsewhere classified 2011    Chronic interstitial cystitis 2012    Degeneration of lumbar or lumbosacral intervertebral disc 2011    Depressive disorder, not elsewhere classified 2014    Essential hypertension, benign 2011    Irritable bowel syndrome 2013    Tobacco use disorder 2015    Unspecified vitamin D deficiency 10/29/2014     Past Surgical History:        Procedure Laterality Date    APPENDECTOMY      BREAST SURGERY      CARDIAC CATHETERIZATION      years ago patient stated had some blockages physician could not treat     SECTION      CHOLECYSTECTOMY      KNEE ARTHROSCOPY      NECK SURGERY      metal plate    OTHER SURGICAL HISTORY  2020    tilt table     Current Medications:   Current Facility-Administered Medications: 0.9 % sodium chloride infusion, 1,000 mL, Intravenous, Continuous  Allergies:  Codeine    Social History:   TOBACCO:   reports that she has been smoking. She has been smoking about 1.00 pack per day. She has never used smokeless tobacco.  ETOH:   reports no history of alcohol use. DRUGS:   reports no history of drug use.   ACTIVITIES OF DAILY LIVING:    OCCUPATION:    Family History:       Problem Relation Age of Onset    Heart Disease Mother     High Blood Pressure Mother     Other Mother         Hypothyroidism    Thyroid Disease Mother     Cancer Father     Heart Disease Father     High Blood Pressure Father        REVIEW OF SYSTEMS:  CONSTITUTIONAL:  negative for  fevers, chills  EYES:  negative for blurred vision, visual disturbance  HEENT:  negative for  hearing loss, voice change  RESPIRATORY:  negative for  dyspnea, wheezing  CARDIOVASCULAR:  negative for  chest pain, palpitations  GASTROINTESTINAL:  negative for nausea, vomiting  GENITOURINARY:  negative for frequency, urinary incontinence  HEMATOLOGIC/LYMPHATIC:  negative for bleeding and petechiae  MUSCULOSKELETAL:  positive for  pain  NEUROLOGICAL:  negative for headaches, dizziness  BEHAVIOR/PSYCH:  negative for increased agitation and anxiety    PHYSICAL EXAM:    VITALS:  BP (!) 178/127   Pulse 77   Temp 97.5 °F (36.4 °C)   Resp 16   Ht 5' 4\" (1.626 m)   Wt 143 lb (64.9 kg)   SpO2 97%   BMI 24.55 kg/m²   CONSTITUTIONAL:  awake, alert, cooperative, no apparent distress, and appears stated age  MUSCULOSKELETAL:  Bilateral lower Extremity:  Edema present about the ankles  Ecchymosis present to medial and lateral malleoli  + TTP to ATFL and lateral malleous bilaterally  + TTP bilateral medial malleoli  Compartments soft and compressible  +PF/DF/EHL  +2/4 DP & PT pulses, Brisk Cap refill, Toes warm and perfused  Distal sensation grossly intact to Peroneals, Sural, Saphenous, and tibial nrs    Right knee:  · Edema present with evidence of a moderate joint effusion  · Knee extensor mechanism intact  · Positive Lachman's with soft endpoint  · Unable to perform anterior drawer secondary to muscle guarding  · Pain with posterior drawer but firm endpoint appreciated. Exam maneuver limited secondary to muscle guarding  · Patient able to tolerate knee range of motion 0 to 90 degrees  · Pain with varus and valgus stress    Right upper extremity:  · Skin intact circumferentially  · Ecchymosis and edema present to posterior superior scapula  · +TTP posterior superior scapula and coracoid  · Compartments soft and compressible  · +AIN/PIN/Ulnar nerve function intact grossly  · +Radial pulse, Brisk Cap refill, hand warm and perfused  · Sensation intact to touch in radial/ulnar/median nerve distributions to hand    Secondary Exam:   · Left UE: No obvious signs of trauma. -TTP to fingers, hand, wrist, forearm, elbow, humerus, shoulder or clavicle. -- Patient able to flex/extend fingers, wrist, elbow and shoulder with active and passive ROM without pain, +2/4 Radial pulse, cap refill <3sec, +AIN/PIN/Radial/Ulnar/Median N, distal sensation grossly intact to C4-T1 dermatomes, compartments soft and compressible. · Pelvis: -TTP, -Log roll, -Heel strike     DATA:    CBC:   Lab Results   Component Value Date    WBC 10.6 11/25/2020    RBC 3.95 11/25/2020    HGB 12.7 11/25/2020    HCT 37.6 11/25/2020    MCV 95.2 11/25/2020    MCH 32.2 11/25/2020    MCHC 33.8 11/25/2020    RDW 13.4 11/25/2020     11/25/2020    MPV 10.3 11/25/2020     PT/INR:    Lab Results   Component Value Date    PROTIME 11.5 11/25/2020    INR 1.0 11/25/2020       Radiology Review:  X-ray right ankle: Fracture of the medial malleolus. There is no medial clear space widening with mechanical stress. No other fractures or dislocations appreciated. X-ray left ankle: Transverse fracture of the medial malleolus.   There is no medial clear space widening with mechanical stress. No other fractures or dislocations appreciated. X-ray right shoulder/scapula: Minimally displaced coracoid fracture. No other fractures or dislocations appreciated. X-ray right knee: No acute process appreciated. IMPRESSION:  · Right medial malleolus fracture  · Left medial malleolus fracture  · Right coracoid fracture  · Right knee strain - possible ACL tear    PLAN:  · Weightbearing as tolerated right lower extremity as long as knee immobilizer and walking boot are on  · Weightbearing as tolerated left lower extremity as long as walking boot is on  · Nonweightbearing right upper extremity  · Sling for comfort right upper extremity  · Recommend PT/OT  · Pain control per admitting  · DVT prophylaxis per admitting  · Patient okay to discharge from orthopedic standpoint once she is able to ambulate and perform ADLs safely - orthopedics will follow peripherally  · Follow-up with Dr. Angy Chapman in 2 weeks  · Plan to be discussed with attending    Orthopaedic Attending    I have seen and evaluated the patient with the resident and agree with the above assessments on today's visit. I have performed the key components of the history and physical examination and concur completely with the findings and plans as documented above. Anticipate nonoperative treatment for her fractures. Patient be placed in bilateral walking boots and a knee immobilizer. Can use the right upper extremity for weightbearing and weight-bear on her bilateral lower extremities. Recommend PT OT eval.  Discharge planning. Follow-up inoffice in 2 weeks for repeat evaluation.     Electronically signed by   Taurus Mosquera DO  11/26/2020

## 2020-11-26 NOTE — PLAN OF CARE
Problem: Skin Integrity:  Goal: Will show no infection signs and symptoms  Description: Will show no infection signs and symptoms  Outcome: Met This Shift  Goal: Absence of new skin breakdown  Description: Absence of new skin breakdown  Outcome: Met This Shift     Problem: Falls - Risk of:  Goal: Absence of physical injury  Description: Absence of physical injury  Outcome: Met This Shift     Problem: Pain:  Goal: Pain level will decrease  Description: Pain level will decrease  Outcome: Ongoing  Goal: Control of acute pain  Description: Control of acute pain  Outcome: Ongoing  Goal: Control of chronic pain  Description: Control of chronic pain  Outcome: Ongoing

## 2020-11-26 NOTE — PROGRESS NOTES
Physical Therapy    Facility/Department: [facility]  Initial Assessment    NAME: Mehdi Combs  : 1960  MRN: [id number]    Date of Service: 2020    Patient Diagnosis(es): The encounter diagnosis was Closed fracture of ankle, unspecified laterality, initial encounter. has a past medical history of Alcohol abuse, unspecified, Allergic rhinitis, cause unspecified, Anxiety disorder in conditions classified elsewhere, Anxiety state, unspecified, Atrial fibrillation ([facility] [address] Chronic airway obstruction, not elsewhere classified, Chronic interstitial cystitis, Degeneration of lumbar or lumbosacral intervertebral disc, Depressive disorder, not elsewhere classified, Essential hypertension, benign, Irritable bowel syndrome, Tobacco use disorder, and Unspecified vitamin D deficiency. has a past surgical history that includes  section; Appendectomy; Breast surgery; Knee arthroscopy; Cholecystectomy; Neck surgery; Cardiac catheterization; and other surgical history (2020). Referring Provider:     Manfred Benavidez MD         Evaluating PT:  Elida Fragoso PT, DPT [id number]    Room #:  3138/3110-  Diagnosis:  Trauma, fall down steps  · Right medial malleolus fracture  · Left medial malleolus fracture  · Right coracoid fracture  · Right knee strain - possible ACL tear  Precautions:  Fall risk,   · Weightbearing as tolerated right lower extremity as long as knee immobilizer and walking boot are on  · Weightbearing as tolerated left lower extremity as long as walking boot is on  · Nonweightbearing right upper extremity  · Sling for comfort right upper extremity  Equipment Needs:  TBD. Crutch and quad cane attempted. Unsure which would be more beneficial.     SUBJECTIVE:    Pt lives with her  in a 2 story home with 3 stairs to enter and 1 rail and one grabbar. Bed and bath is on second floor. Pt reported she could sleep in a recliner on the first floor.     Pt ambulated with no device PTA. OBJECTIVE:   Initial Evaluation  Date: 11/26 Treatment Short Term/ Long Term   Goals   Was pt agreeable to Eval/treatment? yes     Does pt have pain? 9/10 pain everywhere but most limiting in right knee with mobility. Bed Mobility  Rolling: NT  Supine to sit: NT  Sit to supine: NT  Scooting: NT  Min A   Transfers Sit to stand: Max A  Stand to sit: Max A  Stand pivot: NT  Min A   Ambulation    2 feet with SBQC Max A  2 feet with one crutch Max A  15+ feet with AAD Min A    Stair negotiation: ascended and descended  NT  3 steps with 1 rail or AAD Mod A   ROM Left hip and knee WFL, ankle not tested  Right hip WFL, knee and ankle not tested. Strength Left LE:  3-/5 except ankle not tested  Right LE:  Hip 2/5, knee and anklenot tested. Increase LE strength by 1/2 mm grade. Balance Sitting EOB:  NT  Dynamic Standing: Max A with device  Sitting EOB:  Supervision  Dynamic Standing:  Min A   AM-PAC 6 Clicks 60/17       Pt is A & O x 3  Sensation:  Pt denies numbness and tingling to extremities      Patient education  Pt educated on PT objectives during eval and while in the hospital, weight bearing status of bilateral LE, use of knee immobilizer, NWB right UE with sling, body mechanics during transfers, gait sequencing with use of device. Patient response to education:   Pt verbalized understanding Pt demonstrated skill Pt requires further education in this area   yes With cueing yes     ASSESSMENT:    Comments:  Pt found sitting up in the chair with nursing aide present. Pt right knee immobilizer was donned while pt sitting up in the chair. Cueing required for hand placement during transfers. Much effort required to get pt to standing position. Pt reported she did not feel comfortable using a crutch so a quad cane was used first.  Pt required cueing for gait sequencing with use of device. Pt demonstrated difficulty putting weight through right LE more then left LE.   After two steps forward, a crutch was used for ambulation. Again cueing required for gait sequencing. Chair was pulled up behind the pt. At end of eval, pt left sitting up in the chair with call light in reach. Treatment:  Patient practiced and was instructed in the following treatment:     Functional mobility performed as documented above. Much cueing required to maintain NWB right UE and to relax her arm in the sling. Pt's/ family goals   1. To get stronger    Patient and or family understand(s) diagnosis, prognosis, and plan of care. yes    PLAN OF CARE:    Current Treatment Recommendations     [x] Strengthening     [] ROM   [x] Balance Training   [x] Endurance Training   [x] Transfer Training   [x] Gait Training   [x] Stair Training   [] Positioning   [x] Safety and Education Training   [x] Patient/Caregiver Education   [] HEP  [] Other     Frequency of treatments: daily    Time in  4284  Time out  0910    Total Treatment Time  15 minutes     Evaluation Time includes thorough review of current medical information, gathering information on past medical history/social history and prior level of function, completion of standardized testing/informal observation of tasks, assessment of data and education on plan of care and goals.     CPT codes:  [] Low Complexity PT evaluation 96702  [x] Moderate Complexity PT evaluation 18555  [] High Complexity PT evaluation 22137  [] PT Re-evaluation 52218  [] Gait training 77042 _ minutes  [x] Therapeutic activities 29559 15 minutes  [] Therapeutic exercises 64896 _ minutes      Federico Villalta PT, DPT  PT [id number]

## 2020-11-26 NOTE — PLAN OF CARE
Problem: Skin Integrity:  Goal: Will show no infection signs and symptoms  Description: Will show no infection signs and symptoms  11/26/2020 1549 by Ivy Crystal  Outcome: Met This Shift  11/26/2020 1017 by Ivy Crystal  Outcome: Met This Shift  Goal: Absence of new skin breakdown  Description: Absence of new skin breakdown  11/26/2020 1549 by Ivy Crystal  Outcome: Met This Shift  11/26/2020 1017 by Ivy Crystal  Outcome: Met This Shift     Problem: Falls - Risk of:  Goal: Will remain free from falls  Description: Will remain free from falls  Outcome: Met This Shift  Goal: Absence of physical injury  Description: Absence of physical injury  11/26/2020 1549 by Ivy Crystal  Outcome: Met This Shift  11/26/2020 1017 by Ivy Crystal  Outcome: Met This Shift     Problem: Pain:  Goal: Pain level will decrease  Description: Pain level will decrease  11/26/2020 1549 by Ivy Crystal  Outcome: Ongoing  11/26/2020 1017 by Ivy Crystal  Outcome: Ongoing  Goal: Control of acute pain  Description: Control of acute pain  11/26/2020 1549 by Ivy Crystal  Outcome: Ongoing  11/26/2020 1017 by Ivy Crystal  Outcome: Ongoing  Goal: Control of chronic pain  Description: Control of chronic pain  11/26/2020 1549 by Ivy Crystal  Outcome: Ongoing  11/26/2020 1017 by Ivy Crystal  Outcome: Ongoing

## 2020-11-26 NOTE — DISCHARGE SUMMARY
Physician Discharge Summary     Patient ID:  Maite Mendez  [id number]  3 y.o.  1960    Admit date: 11/25/2020    Discharge date and time: 11/28/2020  6:14 PM     Admitting Physician: Stacia Baron MD     Admission Diagnoses: Trauma [T14.90XA]    Discharge Diagnoses: Active Problems:    Trauma    Closed fracture of ankle  Resolved Problems:    * No resolved hospital problems. *      Admission Condition: fair    Discharged Condition: stable    Indication for Admission: fall with multiple fractures    Hospital Course/Procedures/Operation/treatments:   11/25 - 11/26: Patient had drunken fall down 12 stairs the night prior to presentation to [place], hitting her head without LOC, sustaining R scapula and bilateral medial malleolus fx, with concern for ACL injury per ortho as well. Phenobarb started. Admitted for pain control and PTOT evaluation - needs to be ambulatory with R knee immobilizer, bilateral boots, and crutches. 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  11/27: Pt reports no acute issues overnight. Pt asking for a nicotine patch. Complains of being woken up for pain medications. She has been getting up and transferring to a chair on her own. 11/28: Pt doing well. Working with PT/OT. Okay to discharge home today. Consults:   IP CONSULT TO ORTHOPEDIC SURGERY  IP CONSULT TO TRAUMA SURGERY  IP CONSULT TO SOCIAL WORK  IP CONSULT TO HOME CARE NEEDS    Significant Diagnostic Studies:   Xr Knee Right (1-2 Views)    Result Date: 11/25/2020  EXAMINATION: TWO XRAY VIEWS OF THE RIGHT KNEE 11/25/2020 11:11 pm COMPARISON: None. HISTORY: ORDERING SYSTEM PROVIDED HISTORY: fall TECHNOLOGIST PROVIDED HISTORY: Reason for exam:->fall What reading provider will be dictating this exam?->CRC FINDINGS: There is moderate narrowing of medial joint space with marginal spurring. There is mild lateral and patellofemoral joint marginal spurring. There is no fracture seen. There is a moderate suprapatellar joint effusion.     Moderate joint effusion. Degenerative changes. Xr Ankle Left (2 Views)    Result Date: 11/25/2020  EXAMINATION: XRAY VIEWS OF THE LEFT ANKLE 11/25/2020 10:08 pm COMPARISON: None. HISTORY: ORDERING SYSTEM PROVIDED HISTORY: pain TECHNOLOGIST PROVIDED HISTORY: Reason for exam:->pain What reading provider will be dictating this exam?->CRC FINDINGS: Single stress view of the left ankle shows mild widening of the medial tibiofemoral joint space which may reflect ligamentous disruption. Lucency along the medial malleolus suggests nondisplaced avulsion injury. Evaluation is limited based on the single view obtained. Correlation with complete ankle series would be helpful. Single stress view of the left ankle shows mild widening of the medial tibiofemoral joint space which may reflect ligamentous disruption. Lucency along the medial malleolus suggests nondisplaced avulsion injury. Evaluation is limited based on the single view obtained. Correlation with complete ankle series would be helpful. Xr Ankle Right (2 Views)    Result Date: 11/25/2020  EXAMINATION: XRAY VIEWS OF THE RIGHT ANKLE 11/25/2020 10:11 pm COMPARISON: None. HISTORY: ORDERING SYSTEM PROVIDED HISTORY: pain TECHNOLOGIST PROVIDED HISTORY: Reason for exam:->pain What reading provider will be dictating this exam?->CRC FINDINGS: Single stress view of the right ankle shows anatomic alignment and no joint space widening. Lucency along the medial malleolus consistent with nondisplaced avulsion injury of undetermined age. The ankle mortise is otherwise intact. Anatomic alignment on single stress view of the right ankle. Lucency along the medial malleolus consistent with nondisplaced avulsion injury of undetermined age. Evaluation limited based on the single view obtained.       Discharge Exam:  GEN NAD   HEENT: PERRL 3mm  Atraumatic   Resp non labored clear b/l to auscultation   CVS RR no extra heart sounds   ABD SNT   EXT NVI ROM wnl B/L uppers, b/l LE NVI, ecchymosis and swelling b/l ankles moderate tenderness medial mal.  R knee tenderness      Disposition: home    In process/preliminary results:  Outstanding Order Results     No orders found from 10/27/2020 to 11/26/2020. Patient Instructions:   Discharge Medication List as of 11/28/2020  3:14 PM           Details   polyethylene glycol (GLYCOLAX) 17 g packet Take 17 g by mouth daily, Disp-527 g,R-1Normal      sennosides-docusate sodium (SENOKOT-S) 8.6-50 MG tablet Take 2 tablets by mouth daily, Disp-60 tablet,R-0Normal              Details   oxyCODONE (ROXICODONE) 5 MG immediate release tablet Take 1 tablet by mouth every 6 hours as needed for Pain for up to 7 days. , Disp-28 tablet,R-0Normal              Details   vilazodone HCl (VILAZODONE HCL) 20 MG TABS Take 20 mg by mouth dailyHistorical Med      folic acid (FOLVITE) 1 MG tablet Take 1 mg by mouth dailyHistorical Med      Cyanocobalamin (VITAMIN B 12 PO) Take 1 tablet by mouth dailyHistorical Med      ADVAIR DISKUS 250-50 MCG/DOSE AEPB inhale 1 dose by mouth twice a day, Disp-1 Inhaler,R-0Normal      metoprolol succinate (TOPROL XL) 25 MG extended release tablet take 1 tablet by mouth once daily, Disp-30 tablet,R-0Normal      losartan (COZAAR) 100 MG tablet take 1 tablet by mouth once daily, Disp-30 tablet,R-0Normal      pantoprazole (PROTONIX) 40 MG tablet Take 1 tablet by mouth daily, Disp-30 tablet,R-0Normal      simvastatin (ZOCOR) 80 MG tablet take 1 tablet by mouth every evening, Disp-30 tablet,R-0Normal      ALPRAZolam (XANAX) 1 MG tablet Take 1 tablet by mouth 4 times daily as needed for Anxiety . Must establish with Psychiatry for further refills. , Disp-44 tablet,R-0Normal      albuterol sulfate HFA (VENTOLIN HFA) 108 (90 BASE) MCG/ACT inhaler Inhale 2 puffs into the lungs every 6 hours as needed for Wheezing, Disp-1 Inhaler,R-3Normal      nitroGLYCERIN (NITROSTAT) 0.4 MG SL tablet Place 1 tablet under the tongue every 5 minutes as needed for Chest pain (Go to ED if need to use 3 tablet in a row.), Disp-25 tablet, R-0      Cholecalciferol (VITAMIN D3) 2000 UNITS CAPS Take 2 capsules by mouth daily, Disp-30 capsule,R-5Normal      loratadine (CLARITIN) 10 MG capsule Take 1 capsule by mouth daily, Disp-30 capsule,R-5Normal      aspirin (ASPIRIN ADULT LOW DOSE) 81 MG EC tablet Take by mouthHistorical Med             Orthopedics Discharge Instructions    Weight bearing Status - Weight bearing as tolerated - bilaterally lower Extremity   Weightbearing status -nonweightbearing right upper extremity -sling for comfort  o Pain medication per general surgery trauma service   Ice to operative/injured site for 15-30 minutes of each hour for next 5 days    Recommend that you continue to ice the area 2-3 times per day after this    Elevate operative/injured limb on 2 pillows at home  o Goal is to have limb above the heart if able   Continue DVT Prophylaxis (blood clot prevention) as Prescribed by general surgery trauma service   Fracture Care -wear walking boot to right and left lower extremity as well as right knee immobilizer while ambulating  Follow Up in Office in 1-2 weeks with Dr. Emery Fraction, DO    Call the office at [phone] for directions or with any questions. Watch for these significant complications. Call physician if they or any other problems occur:  - Fever over 101°, redness, swelling or warmth at the operative site  - Unrelieved nausea    - Foul smelling or cloudy drainage at the operative site   - Unrelieved pain    - Blood soaked dressing. (Some oozing may be normal)     - Numb, pale, blue, cold or tingling extremity                TRAUMA SERVICES DISCHARGE INSTRUCTIONS    Call [phone], option 2, for any questions/concerns and for follow-up appointment only as needed    Please follow the instructions checked below:    Please follow-up with your primary care provider.     ACTIVITY INSTRUCTIONS  Increase activity as tolerated    PAIN / MEDICATION INSTRUCTIONS  Apply to areas of pain: Ice packs for first 24 hours, hot packs after that. Take medication as prescribed. You may take Ibuprofen or Tylenol (over the counter) as directed for mild pain. --You may take up to 800mg of Ibuprofen every 8 hours for pain, please take with food or milk. --Do NOT take more than 4000mg of Tylenol in 24h. WORK:  You may return to work when you feel ready. Call the trauma clinic for any of the following or for questions/concerns;  --fever over 101F  --redness, swelling, hardness or warmth at the wound site(s).   --Unrelieved nausea/vomiting  --Foul smelling or cloudy drainage at the wound site(s)  --Unrelieved pain or increase in pain  --Increase in shortness of breath    Follow-up:  Trauma Clinic: [phone] option [address]  [place], [id number] [place]      Follow up:   Joanne Huffman DO  [facility] [id number] [phone]    In 2 weeks      [address] [place] [address] [facility] [id number]    As needed       Electronically signed by Lois Carver MD on 11/29/2020 at 7:38 PM

## 2020-11-27 LAB
ANION GAP SERPL CALCULATED.3IONS-SCNC: 9 MMOL/L (ref 7–16)
BASOPHILS ABSOLUTE: 0.06 E9/L (ref 0–0.2)
BASOPHILS RELATIVE PERCENT: 0.8 % (ref 0–2)
BUN BLDV-MCNC: 3 MG/DL (ref 8–23)
CALCIUM SERPL-MCNC: 8.3 MG/DL (ref 8.6–10.2)
CHLORIDE BLD-SCNC: 102 MMOL/L (ref 98–107)
CO2: 26 MMOL/L (ref 22–29)
CREAT SERPL-MCNC: 0.5 MG/DL (ref 0.5–1)
EOSINOPHILS ABSOLUTE: 0.19 E9/L (ref 0.05–0.5)
EOSINOPHILS RELATIVE PERCENT: 2.6 % (ref 0–6)
GFR AFRICAN AMERICAN: >60
GFR NON-AFRICAN AMERICAN: >60 ML/MIN/1.73
GLUCOSE BLD-MCNC: 107 MG/DL (ref 74–99)
HCT VFR BLD CALC: 29.2 % (ref 34–48)
HEMOGLOBIN: 9.6 G/DL (ref 11.5–15.5)
IMMATURE GRANULOCYTES #: 0.02 E9/L
IMMATURE GRANULOCYTES %: 0.3 % (ref 0–5)
LYMPHOCYTES ABSOLUTE: 1.4 E9/L (ref 1.5–4)
LYMPHOCYTES RELATIVE PERCENT: 19.4 % (ref 20–42)
MCH RBC QN AUTO: 32.1 PG (ref 26–35)
MCHC RBC AUTO-ENTMCNC: 32.9 % (ref 32–34.5)
MCV RBC AUTO: 97.7 FL (ref 80–99.9)
MONOCYTES ABSOLUTE: 1.03 E9/L (ref 0.1–0.95)
MONOCYTES RELATIVE PERCENT: 14.3 % (ref 2–12)
NEUTROPHILS ABSOLUTE: 4.5 E9/L (ref 1.8–7.3)
NEUTROPHILS RELATIVE PERCENT: 62.6 % (ref 43–80)
PDW BLD-RTO: 13.4 FL (ref 11.5–15)
PLATELET # BLD: 164 E9/L (ref 130–450)
PMV BLD AUTO: 10.2 FL (ref 7–12)
POTASSIUM REFLEX MAGNESIUM: 3.9 MMOL/L (ref 3.5–5)
RBC # BLD: 2.99 E12/L (ref 3.5–5.5)
SODIUM BLD-SCNC: 137 MMOL/L (ref 132–146)
WBC # BLD: 7.2 E9/L (ref 4.5–11.5)

## 2020-11-27 PROCEDURE — 85025 COMPLETE CBC W/AUTO DIFF WBC: CPT

## 2020-11-27 PROCEDURE — 6360000002 HC RX W HCPCS: Performed by: SURGERY

## 2020-11-27 PROCEDURE — 1200000000 HC SEMI PRIVATE

## 2020-11-27 PROCEDURE — 2580000003 HC RX 258: Performed by: EMERGENCY MEDICINE

## 2020-11-27 PROCEDURE — 97530 THERAPEUTIC ACTIVITIES: CPT

## 2020-11-27 PROCEDURE — 6370000000 HC RX 637 (ALT 250 FOR IP): Performed by: SURGERY

## 2020-11-27 PROCEDURE — 99232 SBSQ HOSP IP/OBS MODERATE 35: CPT | Performed by: SURGERY

## 2020-11-27 PROCEDURE — 36415 COLL VENOUS BLD VENIPUNCTURE: CPT

## 2020-11-27 PROCEDURE — 80048 BASIC METABOLIC PNL TOTAL CA: CPT

## 2020-11-27 PROCEDURE — 6370000000 HC RX 637 (ALT 250 FOR IP): Performed by: STUDENT IN AN ORGANIZED HEALTH CARE EDUCATION/TRAINING PROGRAM

## 2020-11-27 PROCEDURE — 97535 SELF CARE MNGMENT TRAINING: CPT

## 2020-11-27 RX ORDER — OXYCODONE HYDROCHLORIDE 5 MG/1
5 TABLET ORAL EVERY 6 HOURS PRN
Qty: 28 TABLET | Refills: 0 | Status: SHIPPED | OUTPATIENT
Start: 2020-11-27 | End: 2020-12-04

## 2020-11-27 RX ADMIN — OXYCODONE HYDROCHLORIDE 10 MG: 10 TABLET ORAL at 12:39

## 2020-11-27 RX ADMIN — FOLIC ACID 1 MG: 1 TABLET ORAL at 08:39

## 2020-11-27 RX ADMIN — ALPRAZOLAM 1 MG: 1 TABLET ORAL at 16:36

## 2020-11-27 RX ADMIN — PHENOBARBITAL 97.2 MG: 32.4 TABLET ORAL at 08:38

## 2020-11-27 RX ADMIN — ENOXAPARIN SODIUM 30 MG: 30 INJECTION SUBCUTANEOUS at 08:38

## 2020-11-27 RX ADMIN — OXYCODONE HYDROCHLORIDE 10 MG: 10 TABLET ORAL at 08:38

## 2020-11-27 RX ADMIN — METOPROLOL SUCCINATE 25 MG: 25 TABLET, EXTENDED RELEASE ORAL at 08:39

## 2020-11-27 RX ADMIN — PHENOBARBITAL 97.2 MG: 32.4 TABLET ORAL at 13:42

## 2020-11-27 RX ADMIN — OXYCODONE HYDROCHLORIDE 10 MG: 10 TABLET ORAL at 04:11

## 2020-11-27 RX ADMIN — OXYCODONE HYDROCHLORIDE 10 MG: 10 TABLET ORAL at 16:39

## 2020-11-27 RX ADMIN — OXYCODONE HYDROCHLORIDE 10 MG: 10 TABLET ORAL at 20:45

## 2020-11-27 RX ADMIN — LOSARTAN POTASSIUM 100 MG: 50 TABLET, FILM COATED ORAL at 08:39

## 2020-11-27 RX ADMIN — ATORVASTATIN CALCIUM 40 MG: 40 TABLET, FILM COATED ORAL at 19:55

## 2020-11-27 RX ADMIN — VILAZODONE HYDROCHLORIDE 20 MG: 20 TABLET ORAL at 08:39

## 2020-11-27 RX ADMIN — PHENOBARBITAL 97.2 MG: 32.4 TABLET ORAL at 19:55

## 2020-11-27 RX ADMIN — ENOXAPARIN SODIUM 30 MG: 30 INJECTION SUBCUTANEOUS at 19:56

## 2020-11-27 RX ADMIN — SODIUM CHLORIDE 1000 ML: 9 INJECTION, SOLUTION INTRAVENOUS at 11:28

## 2020-11-27 RX ADMIN — PANTOPRAZOLE SODIUM 40 MG: 40 TABLET, DELAYED RELEASE ORAL at 08:39

## 2020-11-27 RX ADMIN — DOCUSATE SODIUM 50 MG AND SENNOSIDES 8.6 MG 2 TABLET: 8.6; 5 TABLET, FILM COATED ORAL at 08:39

## 2020-11-27 ASSESSMENT — PAIN DESCRIPTION - ONSET
ONSET: ON-GOING
ONSET: ON-GOING

## 2020-11-27 ASSESSMENT — PAIN SCALES - GENERAL
PAINLEVEL_OUTOF10: 9
PAINLEVEL_OUTOF10: 8
PAINLEVEL_OUTOF10: 9
PAINLEVEL_OUTOF10: 9
PAINLEVEL_OUTOF10: 0

## 2020-11-27 ASSESSMENT — PAIN - FUNCTIONAL ASSESSMENT
PAIN_FUNCTIONAL_ASSESSMENT: PREVENTS OR INTERFERES SOME ACTIVE ACTIVITIES AND ADLS
PAIN_FUNCTIONAL_ASSESSMENT: PREVENTS OR INTERFERES SOME ACTIVE ACTIVITIES AND ADLS

## 2020-11-27 ASSESSMENT — PAIN DESCRIPTION - FREQUENCY
FREQUENCY: CONTINUOUS
FREQUENCY: CONTINUOUS

## 2020-11-27 ASSESSMENT — PAIN DESCRIPTION - PAIN TYPE
TYPE: ACUTE PAIN
TYPE: ACUTE PAIN

## 2020-11-27 ASSESSMENT — PAIN DESCRIPTION - LOCATION
LOCATION: ANKLE
LOCATION: ANKLE

## 2020-11-27 ASSESSMENT — PAIN DESCRIPTION - ORIENTATION
ORIENTATION: RIGHT;LEFT
ORIENTATION: RIGHT;LEFT

## 2020-11-27 ASSESSMENT — PAIN DESCRIPTION - PROGRESSION
CLINICAL_PROGRESSION: NOT CHANGED
CLINICAL_PROGRESSION: NOT CHANGED

## 2020-11-27 ASSESSMENT — PAIN DESCRIPTION - DESCRIPTORS
DESCRIPTORS: ACHING;CONSTANT;DISCOMFORT
DESCRIPTORS: ACHING;CONSTANT;DISCOMFORT

## 2020-11-27 NOTE — PROGRESS NOTES
Physical Therapy    NAME: Vi Cullen  : 1960  MRN: [id number]     Date of Service: 2020     Patient Diagnosis(es): The encounter diagnosis was Closed fracture of ankle, unspecified laterality, initial encounter.      has a past medical history of Alcohol abuse, unspecified, Allergic rhinitis, cause unspecified, Anxiety disorder in conditions classified elsewhere, Anxiety state, unspecified, Atrial fibrillation ([facility] [address] Chronic airway obstruction, not elsewhere classified, Chronic interstitial cystitis, Degeneration of lumbar or lumbosacral intervertebral disc, Depressive disorder, not elsewhere classified, Essential hypertension, benign, Irritable bowel syndrome, Tobacco use disorder, and Unspecified vitamin D deficiency. has a past surgical history that includes  section; Appendectomy; Breast surgery; Knee arthroscopy; Cholecystectomy; Neck surgery; Cardiac catheterization; and other surgical history (2020).       Referring Provider:      Nereida Pagan MD            Evaluating PT:  Awilda Fritz PT, DPT [id number]     Room #:  7606/9943-A  Diagnosis:  Trauma, fall down steps  · Right medial malleolus fracture  · Left medial malleolus fracture  · Right coracoid fracture  · Right knee strain - possible ACL tear  Precautions:  Fall risk,   · Weightbearing as tolerated right lower extremity as long as knee immobilizer and walking boot are on  · Weightbearing as tolerated left lower extremity as long as walking boot is on  · Nonweightbearing right upper extremity  · Sling for comfort right upper extremity  Equipment Needs:  Wheelchair with elevating legrest vs L hemipost walker.    20 1400 patient states she feels she would do better at home with w/c. 'my  will be able to get me into the house. \"        SUBJECTIVE:     Pt lives with her  in a 2 story home with 3 stairs to enter and 1 rail and one grabbar. Bed and bath is on second floor.   Pt reported she could sleep in a recliner on the first floor. Pt ambulated with no device PTA.     OBJECTIVE:    Initial Evaluation  Date: 11/26 Treatment  11/27/20 Short Term/ Long Term   Goals   Was pt agreeable to Eval/treatment? yes  yes     Does pt have pain? 9/10 pain everywhere but most limiting in right knee with mobility. Reporting significant pain.       Bed Mobility  Rolling: NT  Supine to sit: NT  Sit to supine: NT  Scooting: NT  Rolling: NT  Supine to sit: NT  Sit to supine: Min A for RLE back into bed,   Scooting: NT Min A   Transfers Sit to stand: Max A  Stand to sit: Max A  Stand pivot: NT Sit to stand: Mod A to L hemipost walker. Stand to sit: Min A  Stand pivot:Min with L hemipost walker Min A   Ambulation    2 feet with SBQC Max A  2 feet with one crutch Max A  With L hemipost able to ambulate 10 feet with turn. Required Min A and increased sequencing. 15+ feet with AAD Min A    Stair negotiation: ascended and descended  NT NT  3 steps with 1 rail or AAD Mod A   ROM Left hip and knee WFL, ankle not tested  Right hip WFL, knee and ankle not tested.  Wilbert Payson KI adjusted for fit. Lateral stay digging into thigh.      Strength Left LE:  3-/5 except ankle not tested  Right LE:  Hip 2/5, knee and anklenot tested.    Increase LE strength by 1/2 mm grade. Balance Sitting EOB:  NT  Dynamic Standing:   Max A with device   Sitting EOB:  Supervision  Dynamic Standing:  Min A   AM-PAC 6 Clicks 84/89      17/48         Pt is A & O x 3  Sensation:  Pt denies numbness and tingling to extremities        Patient education  Pt educated on PT objectives during sessionl and while in the hospital, weight bearing status of bilateral LE, use of knee immobilizer, NWB right UE with sling, body mechanics during transfers, gait sequencing with use of device.      Patient response to education:   Pt verbalized understanding Pt demonstrated skill Pt requires further education in this area   yes With cueing yes      ASSESSMENT:     Comments:  Pt found sitting up in the chair working with oT on ADLs. States she has been self transferring. Gait completed with [employer] [facility] and then L hemipost walker. Improved ability to ambulate with L hemipost walker. Mod A to complete sit to stand. If patient takes small steps and increases SHARONA tends to ambulate better. Returned to bed and RLE KI adjusted due to lateral stay digging into her thigh. Patient feels her mobility better this date.    Treatment:  Patient practiced and was instructed in the following treatment:    · Functional mobility performed as documented above. Much cueing required to maintain NWB right UE and to relax her arm in the sling.         Pt's/ family goals   1. To get stronger     Patient and or family understand(s) diagnosis, prognosis, and plan of care. yes     PLAN OF CARE:     Current Treatment Recommendations      [x]? Strengthening     []? ROM   [x]? Balance Training   [x]? Endurance Training   [x]? Transfer Training   [x]? Gait Training   [x]? Stair Training   []? Positioning   [x]? Safety and Education Training   [x]? Patient/Caregiver Education   []? HEP  []? Other      Frequency of treatments: daily     Time in  0925  Time out  0950  Total Treatment Time  25 minutes      Evaluation Time includes thorough review of current medical information, gathering information on past medical history/social history and prior level of function, completion of standardized testing/informal observation of tasks, assessment of data and education on plan of care and goals.     CPT codes:  []? Low Complexity PT evaluation [id number]  []? Moderate Complexity PT evaluation 67782  []? High Complexity PT evaluation [id number]  []? PT Re-evaluation [id number]  []? Gait training 35428 _ minutes  [x]? Therapeutic activities 11823 25 minutes  []?  Therapeutic exercises 84384 _ minutes        Jonh Snowden, [address] [facility]

## 2020-11-27 NOTE — PROGRESS NOTES
OT BEDSIDE TREATMENT NOTE      Date:2020  Patient Name: Vi Cullen  MRN: [id number]  : 1960  Room: 20/5420-A     Per OT Eval:  Referring Provider: Nereida Pagan MD        Evaluating OT: Bradley Fam OTR/L; [id number]     AM-PAC Daily Activity Raw Score:      Recommended Adaptive Equipment:  AE issued , BSC. W/c , shower bench     Diagnosis: Trauma Fall Down Steps                       Right Medial Malleolus Fracture                        Left Medial Malleolus Fracture                        RUE Coracoid Fracture                        R Knee Strain - possible ACL Tear       Surgery: none      Past Medical History        Past Medical History:   Diagnosis Date    Alcohol abuse, unspecified 2015    Allergic rhinitis, cause unspecified 2015    Anxiety disorder in conditions classified elsewhere 2011    Anxiety state, unspecified 2013    Atrial fibrillation ([facility] [address] 10/20/2011    Chronic airway obstruction, not elsewhere classified 2011    Chronic interstitial cystitis 2012    Degeneration of lumbar or lumbosacral intervertebral disc 2011    Depressive disorder, not elsewhere classified 2014    Essential hypertension, benign 2011    Irritable bowel syndrome 2013    Tobacco use disorder 2015    Unspecified vitamin D deficiency 10/29/2014         Precautions:  Falls, WBAT RLE with Knee Immobilizer and walking boot , WBAT LLE with walking boot , NWB RUE, Sling for comfort to [address]: Pt lives with  in a 2 story home with 3 steps to enter and one grab rail .  Bed and bath on 2nd floor   Bathroom setup: tub/shower combo    Equipment owned: none      Prior Level of Function: pt was independent  with ADLs , independent  with IADLs; ambulated with no device   Driving: pt does drive   Occupation: pt is not currently working      Pain Level: 9/10 greater in R LE than LLE and R shoulder  Cognition: A&O: 4/4; Follows multi  step directions              Memory:  Good               Sequencing:  Good               Problem solving:  Good               Judgement/safety: Fair - verbal cues to follow NWB of RUE                 Functional Assessment:    Initial Eval Status  Date: 11/26/20 Treatment Status  Date:11/27 STGs = LTGs  Time frame: 5-7 days   Feeding Set up  [address] for face and oral care  Supervision    UB Dressing Minimal Assist  Min A for sling adjustment  Supervision    LB Dressing Maximal Assist  Issued AE for LE dressing assist, boots already donned  Minimal Assist with AE   Bathing N/A   mod A LHS issued reviewed bathroom safety     Toileting Maximal Assist - assist to get on and off bed pan   mod A will need BSC for home use Minimal Assist    Bed Mobility  Supine to sit: Minimal Assist   Sit to supine: Minimal Assist   n/t supine to sit  Min A sit to supine Supine to sit: Independent   Sit to supine: Independent    Functional Transfers Maximal Assist - sit to stand edge of bed with [employer] [place]  Mod  A from chair with Q cane and min A with hemipost walker with increased cues to not use RUE   Minimal Assist with appropriate assistive device    Functional Mobility Maximal Assist a few steps with SBQC  Min  A with alex post walker in room cues for sequencing and safety  Minimal Assist with appropriate assistive device    Balance Sitting:     Static:  SBA     Dynamic:Min A   Standing: Max A   static Independent  Dynamic SBA  Standing min A      Activity Tolerance Fair  Fair + O2 96% RA     Visual/  Perceptual Glasses: for reading                        Hand Dominance left     Strength ROM Additional Info:    RUE  4-/5 distal strength only tested  WFL distal only  good  and wfl FMC/dexterity noted during ADL tasks         LUE 4/5  WFL good  and wfl FMC/dexterity noted during ADL tasks             Comments: Upon arrival pt sitting up in chair .  At end of session supine with HOB elevated and  all lines and tubes intact, call light within reach. Treatment: performed bathroom safety, alex post walker safety, LE dressing and bathing with AE and DME with AE issued. patient noted to be slightly impulsive and needed cues to maintain RUE NWB. Performed AROM to distal RUE and reapplied sling. Recommending BSC for home  Use and shower seat. · Pt has made good progress towards set goals. · Continue with current plan of care    Treatment Time In: 9:05            Treatment Time Out: 9:40             Treatment Charges: Mins Units   Ther Ex  14136     Manual Therapy [id number]     Thera Activities 53255 20 1   ADL/Home Mgt 20447 15 1   Neuro Re-ed 65619     Group Therapy      Orthotic manage/training  82739     Non-Billable Time     Total Timed Treatment 35 2     Doug Mcwilliams.  Jessica 72, [place] [address]

## 2020-11-27 NOTE — PLAN OF CARE
Problem: Skin Integrity:  Goal: Will show no infection signs and symptoms  Description: Will show no infection signs and symptoms  11/27/2020 1632 by Vincent Robins  Outcome: Met This Shift  11/27/2020 1231 by Vincent Robins  Outcome: Met This Shift  11/27/2020 0307 by Hamlet Balbuena RN  Outcome: Met This Shift  Goal: Absence of new skin breakdown  Description: Absence of new skin breakdown  11/27/2020 1632 by Vincent Robins  Outcome: Met This Shift  11/27/2020 1231 by Vincent Robins  Outcome: Met This Shift  11/27/2020 0307 by Hamlet Balbuena RN  Outcome: Met This Shift     Problem: Falls - Risk of:  Goal: Will remain free from falls  Description: Will remain free from falls  11/27/2020 1632 by Vincent Robins  Outcome: Met This Shift  11/27/2020 1231 by Vincent Robins  Outcome: Met This Shift  11/27/2020 0307 by Hamlet Balbuena RN  Outcome: Met This Shift  Goal: Absence of physical injury  Description: Absence of physical injury  11/27/2020 1632 by Vincent Robins  Outcome: Met This Shift  11/27/2020 1231 by Vincent Robins  Outcome: Met This Shift  11/27/2020 0307 by Hamlet Balbuena RN  Outcome: Met This Shift     Problem: Musculor/Skeletal Functional Status  Goal: Absence of falls  11/27/2020 1231 by Vincent Robins  Outcome: Met This Shift  11/27/2020 0307 by Hamlet Balbuena RN  Outcome: Met This Shift

## 2020-11-27 NOTE — PLAN OF CARE
Problem: Skin Integrity:  Goal: Will show no infection signs and symptoms  Description: Will show no infection signs and symptoms  11/27/2020 1231 by Angel Garcia  Outcome: Met This Shift  11/27/2020 0307 by Kamar Villanueva RN  Outcome: Met This Shift  Goal: Absence of new skin breakdown  Description: Absence of new skin breakdown  11/27/2020 1231 by Angel Garcia  Outcome: Met This Shift  11/27/2020 0307 by Kamar Villanueva RN  Outcome: Met This Shift     Problem: Falls - Risk of:  Goal: Will remain free from falls  Description: Will remain free from falls  11/27/2020 1231 by Angel Garcia  Outcome: Met This Shift  11/27/2020 0307 by Kamar Villnaueva RN  Outcome: Met This Shift  Goal: Absence of physical injury  Description: Absence of physical injury  11/27/2020 1231 by Angel Garcia  Outcome: Met This Shift  11/27/2020 0307 by Kamar Villanueva RN  Outcome: Met This Shift     Problem: Musculor/Skeletal Functional Status  Goal: Absence of falls  11/27/2020 1231 by Angel Garcia  Outcome: Met This Shift  11/27/2020 0307 by Kamar Villanueva RN  Outcome: Met This Shift     Problem: Pain:  Goal: Pain level will decrease  Description: Pain level will decrease  11/27/2020 1231 by Angel Garcia  Outcome: Ongoing  11/27/2020 0307 by Kamar Villanueva RN  Outcome: Met This Shift  Goal: Control of acute pain  Description: Control of acute pain  11/27/2020 1231 by Angel Garcia  Outcome: Ongoing  11/27/2020 0307 by Kamar Villanueva RN  Outcome: Met This Shift  Goal: Control of chronic pain  Description: Control of chronic pain  11/27/2020 1231 by Angel Garcia  Outcome: Ongoing  11/27/2020 0307 by Kamar Villanueva RN  Outcome: Met This Shift     Problem: Musculor/Skeletal Functional Status  Goal: Highest potential functional level  11/27/2020 1231 by Angel Garcia  Outcome: Ongoing  11/27/2020 0307 by Kamar Villanueva RN  Outcome: Met This Shift

## 2020-11-27 NOTE — PLAN OF CARE
Problem: Skin Integrity:  Goal: Will show no infection signs and symptoms  Description: Will show no infection signs and symptoms  11/27/2020 0307 by Willia Brunner, RN  Outcome: Met This Shift     Problem: Skin Integrity:  Goal: Absence of new skin breakdown  Description: Absence of new skin breakdown  11/27/2020 0307 by Willia Brunner, RN  Outcome: Met This Shift     Problem: Falls - Risk of:  Goal: Will remain free from falls  Description: Will remain free from falls  11/27/2020 0307 by Willia Brunner, RN  Outcome: Met This Shift     Problem: Falls - Risk of:  Goal: Absence of physical injury  Description: Absence of physical injury  11/27/2020 0307 by Willia Brunner, RN  Outcome: Met This Shift     Problem: Pain:  Goal: Pain level will decrease  Description: Pain level will decrease  11/27/2020 0307 by Willia Brunner, RN  Outcome: Met This Shift     Problem: Pain:  Goal: Control of acute pain  Description: Control of acute pain  11/27/2020 0307 by Willia Brunner, RN  Outcome: Met This Shift     Problem: Pain:  Goal: Control of chronic pain  Description: Control of chronic pain  11/27/2020 0307 by Willia Brunner, RN  Outcome: Met This Shift     Problem: Musculor/Skeletal Functional Status  Goal: Highest potential functional level  Outcome: Met This Shift     Problem: Musculor/Skeletal Functional Status  Goal: Absence of falls  Outcome: Met This Shift

## 2020-11-27 NOTE — CARE COORDINATION
Met with the pt at the bedside. She lives with her  in a 2 story home. She will return home when medically stable. She will need a wheelchair. She would like to use [facility] DME. Will need statement from physician stating: \" A wheelchair is necessary because it is the least level of equipment needed for the patient to be mobile her the home to accomplish ADLs. \" Rita Linton RN

## 2020-11-27 NOTE — PROGRESS NOTES
Spoke with surgical resident on call about the SBI needing to be completed for this patient to be able to get her discharged. Informed by  that SBI needs to be completed by Social Work. Will relay this message to social work in the am and to oncoming day shift nurse. Perfect serve sent to surgical resident on call to relay this information.

## 2020-11-28 VITALS
HEART RATE: 89 BPM | OXYGEN SATURATION: 98 % | DIASTOLIC BLOOD PRESSURE: 77 MMHG | SYSTOLIC BLOOD PRESSURE: 157 MMHG | RESPIRATION RATE: 18 BRPM | TEMPERATURE: 97.3 F | BODY MASS INDEX: 24.41 KG/M2 | WEIGHT: 143 LBS | HEIGHT: 64 IN

## 2020-11-28 LAB
ANION GAP SERPL CALCULATED.3IONS-SCNC: 11 MMOL/L (ref 7–16)
BASOPHILS ABSOLUTE: 0.06 E9/L (ref 0–0.2)
BASOPHILS RELATIVE PERCENT: 0.9 % (ref 0–2)
BUN BLDV-MCNC: 3 MG/DL (ref 8–23)
CALCIUM SERPL-MCNC: 8.7 MG/DL (ref 8.6–10.2)
CHLORIDE BLD-SCNC: 103 MMOL/L (ref 98–107)
CO2: 24 MMOL/L (ref 22–29)
CREAT SERPL-MCNC: 0.5 MG/DL (ref 0.5–1)
EOSINOPHILS ABSOLUTE: 0.24 E9/L (ref 0.05–0.5)
EOSINOPHILS RELATIVE PERCENT: 3.5 % (ref 0–6)
GFR AFRICAN AMERICAN: >60
GFR NON-AFRICAN AMERICAN: >60 ML/MIN/1.73
GLUCOSE BLD-MCNC: 100 MG/DL (ref 74–99)
HCT VFR BLD CALC: 27.9 % (ref 34–48)
HEMOGLOBIN: 9.4 G/DL (ref 11.5–15.5)
IMMATURE GRANULOCYTES #: 0.02 E9/L
IMMATURE GRANULOCYTES %: 0.3 % (ref 0–5)
LYMPHOCYTES ABSOLUTE: 1.5 E9/L (ref 1.5–4)
LYMPHOCYTES RELATIVE PERCENT: 22 % (ref 20–42)
MCH RBC QN AUTO: 32.9 PG (ref 26–35)
MCHC RBC AUTO-ENTMCNC: 33.7 % (ref 32–34.5)
MCV RBC AUTO: 97.6 FL (ref 80–99.9)
MONOCYTES ABSOLUTE: 0.92 E9/L (ref 0.1–0.95)
MONOCYTES RELATIVE PERCENT: 13.5 % (ref 2–12)
NEUTROPHILS ABSOLUTE: 4.09 E9/L (ref 1.8–7.3)
NEUTROPHILS RELATIVE PERCENT: 59.8 % (ref 43–80)
PDW BLD-RTO: 13.2 FL (ref 11.5–15)
PLATELET # BLD: 174 E9/L (ref 130–450)
PMV BLD AUTO: 10.9 FL (ref 7–12)
POTASSIUM REFLEX MAGNESIUM: 3.6 MMOL/L (ref 3.5–5)
RBC # BLD: 2.86 E12/L (ref 3.5–5.5)
SODIUM BLD-SCNC: 138 MMOL/L (ref 132–146)
WBC # BLD: 6.8 E9/L (ref 4.5–11.5)

## 2020-11-28 PROCEDURE — 97535 SELF CARE MNGMENT TRAINING: CPT

## 2020-11-28 PROCEDURE — 97530 THERAPEUTIC ACTIVITIES: CPT

## 2020-11-28 PROCEDURE — 97168 OT RE-EVAL EST PLAN CARE: CPT

## 2020-11-28 PROCEDURE — 6370000000 HC RX 637 (ALT 250 FOR IP): Performed by: STUDENT IN AN ORGANIZED HEALTH CARE EDUCATION/TRAINING PROGRAM

## 2020-11-28 PROCEDURE — 2580000003 HC RX 258: Performed by: STUDENT IN AN ORGANIZED HEALTH CARE EDUCATION/TRAINING PROGRAM

## 2020-11-28 PROCEDURE — 6360000002 HC RX W HCPCS: Performed by: SURGERY

## 2020-11-28 PROCEDURE — 99232 SBSQ HOSP IP/OBS MODERATE 35: CPT | Performed by: SURGERY

## 2020-11-28 PROCEDURE — 36415 COLL VENOUS BLD VENIPUNCTURE: CPT

## 2020-11-28 PROCEDURE — 85025 COMPLETE CBC W/AUTO DIFF WBC: CPT

## 2020-11-28 PROCEDURE — 80048 BASIC METABOLIC PNL TOTAL CA: CPT

## 2020-11-28 PROCEDURE — 6370000000 HC RX 637 (ALT 250 FOR IP): Performed by: SURGERY

## 2020-11-28 RX ADMIN — PHENOBARBITAL 97.2 MG: 32.4 TABLET ORAL at 08:57

## 2020-11-28 RX ADMIN — ENOXAPARIN SODIUM 30 MG: 30 INJECTION SUBCUTANEOUS at 08:55

## 2020-11-28 RX ADMIN — ALPRAZOLAM 1 MG: 1 TABLET ORAL at 13:05

## 2020-11-28 RX ADMIN — DOCUSATE SODIUM 50 MG AND SENNOSIDES 8.6 MG 2 TABLET: 8.6; 5 TABLET, FILM COATED ORAL at 08:56

## 2020-11-28 RX ADMIN — OXYCODONE HYDROCHLORIDE 10 MG: 10 TABLET ORAL at 06:45

## 2020-11-28 RX ADMIN — SODIUM CHLORIDE, PRESERVATIVE FREE 10 ML: 5 INJECTION INTRAVENOUS at 08:57

## 2020-11-28 RX ADMIN — OXYCODONE HYDROCHLORIDE 10 MG: 10 TABLET ORAL at 15:15

## 2020-11-28 RX ADMIN — LOSARTAN POTASSIUM 100 MG: 50 TABLET, FILM COATED ORAL at 08:56

## 2020-11-28 RX ADMIN — OXYCODONE HYDROCHLORIDE 10 MG: 10 TABLET ORAL at 11:15

## 2020-11-28 RX ADMIN — VILAZODONE HYDROCHLORIDE 20 MG: 20 TABLET ORAL at 08:59

## 2020-11-28 RX ADMIN — METOPROLOL SUCCINATE 25 MG: 25 TABLET, EXTENDED RELEASE ORAL at 09:01

## 2020-11-28 RX ADMIN — OXYCODONE HYDROCHLORIDE 10 MG: 10 TABLET ORAL at 01:34

## 2020-11-28 RX ADMIN — PHENOBARBITAL 97.2 MG: 32.4 TABLET ORAL at 15:04

## 2020-11-28 RX ADMIN — PANTOPRAZOLE SODIUM 40 MG: 40 TABLET, DELAYED RELEASE ORAL at 08:55

## 2020-11-28 RX ADMIN — FOLIC ACID 1 MG: 1 TABLET ORAL at 08:55

## 2020-11-28 RX ADMIN — ALPRAZOLAM 1 MG: 1 TABLET ORAL at 08:56

## 2020-11-28 ASSESSMENT — PAIN SCALES - GENERAL
PAINLEVEL_OUTOF10: 10
PAINLEVEL_OUTOF10: 8
PAINLEVEL_OUTOF10: 9
PAINLEVEL_OUTOF10: 10
PAINLEVEL_OUTOF10: 8

## 2020-11-28 ASSESSMENT — PAIN DESCRIPTION - DESCRIPTORS
DESCRIPTORS: ACHING;BURNING;CONSTANT

## 2020-11-28 ASSESSMENT — PAIN DESCRIPTION - PAIN TYPE
TYPE: ACUTE PAIN

## 2020-11-28 ASSESSMENT — PAIN DESCRIPTION - ORIENTATION
ORIENTATION: RIGHT;LEFT

## 2020-11-28 ASSESSMENT — PAIN DESCRIPTION - LOCATION
LOCATION: LEG

## 2020-11-28 NOTE — PROGRESS NOTES
Trauma Attending Progress Note       CC: fall down steps while drinking    S: she feels ok today, c/o not enough  Nicotine,     GEN NAD   HEENT: PERRL 3mm  Atraumatic   Resp non labored clear b/l to auscultation   CVS RR no extra heart sounds   ABD SNT   EXT NVI ROM wnl B/L uppers, b/l LE NVI, ecchymosis and swelling b/l ankles moderate tenderness medial mal.  R knee tenderness     A/P - s/p fall down steps with R coracoid fx, R knee strain, b/l medial mal fx,     - Ortho recs appreciated, non op mgmt   - ETOH abuse, pnb   - pain control   - home meds  - PTOT and d/c planning.         Fidel Morillo MD

## 2020-11-28 NOTE — PROGRESS NOTES
Patient uses right arm to assist herself in getting out of bed. Patient educated on importance of keeping her right arm in the sling and using her left arm.

## 2020-11-28 NOTE — PROGRESS NOTES
OCCUPATIONAL THERAPY INITIAL EVALUATION    Occupational Therapy       RE-EVAL s/p change in WB status and function     Date:2020  Patient Name: Ro Ch  MRN: [id number]  : 1960  Room: [address] [place] [address]-A     Referring Provider: Yandel Amaro MD     RE-evaluating OT: Chuy Hood, OTR/L    Evaluating OT: Ana Aragon OTR/L; [id number]     AM-PAC Daily Activity Raw Score:      Recommended Adaptive Equipment:  extended seat tub bench; [employer]      Diagnosis: Trauma Fall Down Steps                       Right Medial Malleolus Fracture                        Left Medial Malleolus Fracture                        RUE Coracoid Fracture                        R Knee Strain - possible ACL Tear       Surgery: none       Precautions:  Fall risk, WBAT RLE Knee Immobilizer and Cam Boot, WBAT LLE and Cam Boot, WBAT RUE, Sling for Comfort RUE, \"No Heavy Lifting\"- UPDATED  7:37 AM PER DR Oliva Harper DO (ORTHOPEDIC SURGERY)      Home Living: Pt lives with  in a 2 story home with 3 steps to enter and one grab rail .  Bed and bath on 2nd floor   Bathroom setup: tub/shower combo    Equipment owned: none      Prior Level of Function: pt was independent  with ADLs , independent  with IADLs; ambulated with no device   Driving: pt does drive   Occupation: pt is not currently working      Pain Level: all over pain generalized pain   Cognition: A&O: 4/4; Follows multi  step directions              Memory:  Good               Sequencing:  Good               Problem solving:  Good               Judgement/safety: Fair - verbal cues to follow NWB of RUE      Functional Assessment:   RE- Eval Status  Date:  Treatment Status  Date: Short Term Goals  Treatment frequency: 1-3x/week on PRN    Feeding IND     Grooming/Hygiene Min A d/t pain in RUE and limitations    Mod I      UB Dressing Min A   Mod I     LB Dressing Min A; edu on LB AE (has in room)   Mod I    Bathing Min A     Toileting SBA  Pt able to perform leigh ann care; Max cues on proper positioning and bowel habits and colon massage in order to have effective bowel movement-pt c/o constipation but able to have a BM with cues, edu  and positioing   Mod I   Bed Mobility  Supine to sit: NT  Sit to supine: NT     Functional Transfers Sit to stand: SBA [employer]  Stand to sit: SBA [employer]  Stand pivot: SBA [employer]  In room/bathroom min cues for sequencing and precautions to modify for RLE immobilizer and no heavy lifting RUE  Mod I   Functional Mobility SBA with WW in room/bathroom    Mod I    Balance Sitting:      Static:IND    Dynamic:SUP  Standing: SBA     Endurance/Activity Tolerance Fair    G     Visual/  Perceptual Glasses: WFL        UE strengthening    See UE Assessment Below  G tolerance  For BUE AROM/AAROM in all planes within safe precautions to improve overall function for ADL tasks     UE Assessment  Hand dominance: L; but mostly uses R     Strength ROM  Comments   RUE  Grossly WFL  Grossly WFL  G  and G FMC/dexterity noted during ADL tasks   LUE Proximal: 4/5  Distal: 4/5 Proximal: WFL  Distal: WFL G  and G FMC/dexterity noted during ADL tasks     Hearing: WFL  Sensation:  No c/o numbness or tingling  Tone: grossly WNL  Edema: none                               Comments:  Upon arrival pt seated up in chair. After dressing task attempt, functional transfers and mobility in room/bathroom, toileting task, and hygiene task, pt left in chair with all devices within reach, all lines and tubes intact-UE elevated. Overall, patient demonstrates mild difficulties with completion of BADLs and IADLs. Factors contributing to these difficulties include precautions, pain, decreased tolerance.    Based on patient's functional performance as stated above and level of assistance needed prior to admission, this therapist believes that the patient would benefit from Continue therapy to evaluate home safety, ADL functioning and to build endurance/activity tolerance to improve overall independence and decrease fall risk. Treatment:     ? ADL completion: Self-care retraining for the above-mentioned ADLs; training on proper hand placement, safety technique, sequencing, and energy conservation techniques during ADLs and while completing functional transfers. ? Education: OT POC, OT role, Importance of completing ADL tasks daily as IND as possible to aide in recovery process, fall risk precautions, being OOB to chair for all meals, bowel and bladder habits, proper anorectal angle. Assessment of current deficits:   Functional mobility [x]  ADLs [x] Strength []  Cognition []  Functional transfers  [x] IADLs [x] Safety Awareness []  Endurance [x]  Fine Motor Coordination [] Balance [x] Vision/perception [] Sensation []   Gross Motor Coordination [] ROM [x] Delirium []                  Motor Control []     Plan of Care: 2-3 days/week for 1-2 weeks PRN   [x]ADL retraining/adapted techniques and AE recommendations to increase functional independence within precautions                    [x]Energy conservation techniques to improve tolerance for selfcare routine   [x]Functional transfer/mobility training/DME recommendations for increased independence, safety and fall prevention         [x]Patient/family education to increase safety and functional independence             [x]Environmental modifications for safe mobility and completion of ADLs                             []Cognitive retraining ex's to improve problem solving skills & safe participation in ADLs/IADLs     []Sensory re-education techniques to improve extremity awareness, maintain skin integrity and improve hand function                             []Visual/Perceptual retraining  to improve body awareness and safety during transfers and ADLs  []Splinting/positioning needs to maintain joint/skin integrity and prevent contractures  [x]Therapeutic activity to improve functional performance during ADLs. [x]Therapeutic exercise to improve tolerance and functional strength for ADLs   [x]Balance retraining/tolerance tasks for facilitation of postural control with dynamic challenges during ADLs . []Neuromuscular re-education: facilitation of righting/equilibrium reactions, midline orientation, scapular stability/mobility, Normalization muscle     tone and facilitation active functional movement/Attention                         []Delirium prevention/treatment    []Positioning to improve functional independence  []Other:       Rehab Potential: G Good for established goals     Patient / Family Goal: to go home ASAP      Patient and/or family were instructed on functional diagnosis, prognosis/goals and OT plan of care. Demonstrated F+ understanding. Time In:0920  Time Out: 0950  Total Treatment Time:25    Min Units   OT Eval Low 38137       OT Eval Medium 91539      OT Eval High 78247       OT Re-Eval 02304  5  1   Therapeutic Ex 95140       Therapeutic Activities 12371  10  1   ADL/Self Care 82835  15  1   Orthotic Management 41384       Neuro Re-Ed 20575       Non-Billable Time          Evaluation Time includes thorough review of current medical information, gathering information on past medical history/social history and prior level of function, completion of standardized testing/informal observation of tasks, assessment of data and education on plan of care and goals.       Ondina Peguero OTR/L   1980

## 2020-11-28 NOTE — PROGRESS NOTES
Physical Therapy  Facility/Department: [facility]  Daily Treatment Note  NAME: Selvin Thayer  : 1960  MRN: [id number]    Date of Service: 2020      Referring Provider:      Lesley Garcia MD          Evaluating PT: Negrito Lassiter PT, DPT [id number]     Room #:  7097/7034-A  Laury Stoll, fall down steps  · Right medial malleolus fracture  · Left medial malleolus fracture  · Right coracoid fracture  · Right knee strain - possible ACL tear  Precautions:  Fall risk, WBAT RLE Knee Immobilizer and Cam Boot, WBAT LLE and Cam Boot, WBAT RUE, Sling for Comfort RUE, \"No Heavy Lifting\"- UPDATED  7:37 AM PER DR Voncille Cogan, DO (ORTHOPEDIC SURGERY)    Equipment Needs:     WW  BSC        SUBJECTIVE:     Pt lives with her  in a 2 story home with 3 stairs to enter and 1 rail and one grabbar.  Bed and bath is on second floor.  Pt reported she could sleep in a recliner on the first floor.    Pt ambulated with no device PTA.     OBJECTIVE:    Initial Evaluation  Date:  Treatment  20 Short Term/ Long Term   Goals   Was pt agreeable to Eval/treatment? yes  yes     Does pt have pain? 9/10 pain everywhere but most limiting in right knee with mobility.  Moderate pain R shoulder      Bed Mobility  Rolling: NT  Supine to sit: NT  Sit to supine: NT  Scooting: NT  Rolling: NT  Supine to sit: NT- reports independence  Sit to supine: NT  Scooting: SBA Min A   Transfers Sit to stand: Max A  Stand to sit: Max A  Stand pivot: NT Sit to stand: SBA [employer]  Stand to sit: SBA [employer]  Stand pivot: SBA [employer]  Min A   Ambulation    2 feet with [employer] Max A  2 feet with one crutch Max A  125', 10' [employer] SBA 15+ feet with AAD Min A    Stair negotiation: ascended and descended  NT 4 stairs (laterally using R Hand rail) SBA 3 steps with 1 rail or AAD Mod A   ROM Left hip and knee WFL, ankle not tested  Right hip WFL, knee and ankle not tested.        Strength Left LE:  3-/5 except ankle not tested  Right LE:  Hip 2/, knee and anklenot tested.    Increase LE strength by 1/2 mm grade.    Balance Sitting EOB:  NT  Dynamic Standing:  Max A with device  Sitting EOB:  Supervision  Dynamic Standing:  SBA WW Sitting EOB:  Supervision  Dynamic Standing:  Min A   AM-PAC 6 Clicks 54/79      74/97         Pt is A & O x 3  Sensation:  Pt denies numbness and tingling to extremities         Patient education  Pt educated on role of PT    Patient response to education:   Pt verbalized understanding Pt demonstrated skill Pt requires further education in this area   x x x     ASSESSMENT:    Comments:  Pt received at edge of bed agreeable to PT. Pts RUE upgraded to [facility] 11/26 7:37 AM per Dr. Trey Mtahis (orthopedic surgery). Pt educated and updated on all precautions. Pt reports independence with bed mobility, as she is sitting EOB upon entrance. Pt performing functional transfers with cues for proper hand placement using WW. Pt ambulating with decreased speed, step to, antalgic gait. Pt gait pattern steady. Pt educated on proper positioning and sequencing to facilitate independence with stair negotiation. Pt has RHR to enter home, so lateral approach was performed. Pt performed without gross LOB. Car transfer mechanics discussed, and pt reports good understanding. Patient would benefit from continued skilled PT to maximize functional mobility independence. Pt reports spouse will be able to assist PRN.  [place] was notified about change in DME needs, as pt is now allowed to bear weight through [address]. RN informed of upgrade in WB as well. Treatment:  Patient practiced and was instructed in the following treatment:    Functional transfers-Verbal cues for proper positioning and sequencing to perform transfers safely with maximum independence. Gait training-Verbal cues for proper positioning and sequencing using assistive device to maximize functional mobility independence.   Stair negotiation- verbal cues to facilitate independence  Car transfer-  Mechanics discussed, pt reports good understanding  Education on all precautions. PLAN:      PLAN OF CARE:    Current Treatment Recommendations     [x] Strengthening     [x] ROM   [x] Balance Training   [x] Endurance Training   [x] Transfer Training   [x] Gait Training   [x] Stair Training   [x] Positioning   [x] Safety and Education Training   [x] Patient/Caregiver Education   [x] HEP  [] Other       Patient is making good progress towards established goals. Will continue with current POC.       Time in  0809  Time out  0847    Total Treatment Time  38 minutes     CPT codes:  [] Gait training 87853 0 minutes  [] Manual therapy 47839 0 minutes  [x] Therapeutic activities 82200 38 minutes  [] Therapeutic exercises 82804 0 minutes  [] Neuromuscular reeducation 09340 0 minutes    Nely Erickson PT, DPT  [id number]

## 2020-11-28 NOTE — PROGRESS NOTES
GENERAL SURGERY  DAILY PROGRESS NOTE  11/28/2020    Subjective:   FWW for home with Dx: bilateral medical malleolus fracture       Electronically signed by Oumou Jarquin DO on 11/28/2020 at 9:33 AM

## 2020-11-28 NOTE — PROGRESS NOTES
CLINICAL PHARMACY NOTE: MEDS TO [address] Select Patient?: No  Total # of Prescriptions Filled: 2   The following medications were delivered to the patient:  · Senexon 50 mg  · Oxycodone 5 mg  Total # of Interventions Completed: 2  Time Spent (min): 30    Additional Documentation:  Told patient to buy miralax over the counter its not covered

## 2020-11-28 NOTE — CARE COORDINATION
SOCIAL WORK/CASEMANAGEMENT TRANSITION OF CARE [id number] [place], [address], [facility], [phone]): Roberto Ag from PT called and said pt no longer needs the W/C delivered to room from [facility]. She can now benefit from a fww. I called [facility] dme and pt and they are both in agreement for [facility] to come and get the w/c and replace with a fww. I sent a perfect serve to dr. Kyle Edwards resident for progress note and a new fww order asap. It will be delivered after 12 p.m. I then sent a referral to [facility] for home PT and OT per pt preference. I called the [facility] line but intake was not responding. When I was connected to the answering service they connected me to the rn on call for [facility].  So I faxed the referral over and will try and reach the intake rn later today Shubham Moreno  11/28/2020

## 2020-11-30 NOTE — PROGRESS NOTES
Dr Alexi Bocanegra notified of admission    Electronically signed by DONY Langley CNP on 11/30/2020 at 10:58 AM

## 2020-12-01 ENCOUNTER — TELEPHONE (OUTPATIENT)
Dept: ADMINISTRATIVE | Age: 60
End: 2020-12-01

## 2020-12-01 NOTE — TELEPHONE ENCOUNTER
Patient called to set up a 2 week hospital f/u with Dr. Rosy Marlow, she was discharged on 11/28 with several breaks, she can be reached at [phone].

## 2020-12-01 NOTE — TELEPHONE ENCOUNTER
Per consult note:  Radiology Review:  X-ray right ankle: Fracture of the medial malleolus. There is no medial clear space widening with mechanical stress. No other fractures or dislocations appreciated.     X-ray left ankle: Transverse fracture of the medial malleolus. There is no medial clear space widening with mechanical stress. No other fractures or dislocations appreciated.     X-ray right shoulder/scapula: Minimally displaced coracoid fracture.   No other fractures or dislocations appreciated.     X-ray right knee: No acute process appreciated.        IMPRESSION:  · Right medial malleolus fracture  · Left medial malleolus fracture  · Right coracoid fracture  · Right knee strain - possible ACL tear     PLAN:  · Weightbearing as tolerated right lower extremity as long as knee immobilizer and walking boot are on  · Weightbearing as tolerated left lower extremity as long as walking boot is on  · Nonweightbearing right upper extremity  · Sling for comfort right upper extremity  · Recommend PT/OT  · Pain control per admitting  · DVT prophylaxis per admitting  · Patient okay to discharge from orthopedic standpoint once she is able to ambulate and perform ADLs safely - orthopedics will follow peripherally  · Follow-up with Dr. Adeel Ridley in 2 weeks  · Plan to be discussed with attending

## 2020-12-01 NOTE — TELEPHONE ENCOUNTER
Call placed to pt, appt made for 12/10 at 10:30. Pt verbally confirmed appt date and time. Directions to office provided.

## 2020-12-02 ENCOUNTER — TELEPHONE (OUTPATIENT)
Dept: FAMILY MEDICINE CLINIC | Age: 60
End: 2020-12-02

## 2020-12-07 NOTE — TELEPHONE ENCOUNTER
Patient's [facility] PT, Emily, called office inquiring on patient's pain control. She stated patient was sent home with roxicodone, but patient is not tolerating pain at this time. DOI: 11/25/20, fell down stairs. Non- operative management. IMPRESSION:  · Right medial malleolus fracture  · Left medial malleolus fracture  · Right coracoid fracture  · Right knee strain - possible ACL tear    Orders for multimodal pain control pended and routed for review and signature. Patient already prescribed gabapentin (chronic use from another physician)    Attempted to call patient. No answer and unable to leave voicemail.

## 2020-12-08 RX ORDER — IBUPROFEN 800 MG/1
800 TABLET ORAL 2 TIMES DAILY PRN
Qty: 60 TABLET | Refills: 0 | Status: SHIPPED | OUTPATIENT
Start: 2020-12-08

## 2020-12-08 RX ORDER — OXYCODONE HYDROCHLORIDE AND ACETAMINOPHEN 5; 325 MG/1; MG/1
1 TABLET ORAL EVERY 6 HOURS PRN
Qty: 28 TABLET | Refills: 0 | Status: SHIPPED | OUTPATIENT
Start: 2020-12-08 | End: 2020-12-15

## 2020-12-08 RX ORDER — METHOCARBAMOL 750 MG/1
750 TABLET, FILM COATED ORAL 3 TIMES DAILY
Qty: 90 TABLET | Refills: 0 | Status: SHIPPED
Start: 2020-12-08 | End: 2021-01-07 | Stop reason: SDUPTHER

## 2020-12-08 RX ORDER — ACETAMINOPHEN 500 MG
500 TABLET ORAL 3 TIMES DAILY PRN
Qty: 360 TABLET | Refills: 1 | Status: SHIPPED | OUTPATIENT
Start: 2020-12-08

## 2020-12-08 NOTE — TELEPHONE ENCOUNTER
Oj Mendoza is 1.5 weeks from the following Injury:    DOI: 11/25/20, fell down stairs. Non- operative management. · Right medial malleolus fracture  · Left medial malleolus fracture  · Right coracoid fracture  · Right knee strain - possible ACL tear    OARRS report reviewed  Last RX filled on 11/27/2020  Plan for wean: refilled at every 6 hours, wean with next RX    Controlled Substance Monitoring:    Acute and Chronic Pain Monitoring:   RX Monitoring 12/8/2020   Attestation -   Periodic Controlled Substance Monitoring No signs of potential drug abuse or diversion identified.         Electronically signed by Leatha Josue PA-C on 12/8/2020 at 8:17 AM

## 2020-12-10 ENCOUNTER — HOSPITAL ENCOUNTER (OUTPATIENT)
Dept: GENERAL RADIOLOGY | Age: 60
Discharge: HOME | End: 2020-12-12
Payer: COMMERCIAL

## 2020-12-10 ENCOUNTER — OFFICE VISIT (OUTPATIENT)
Dept: ORTHOPEDIC SURGERY | Age: 60
End: 2020-12-10
Payer: COMMERCIAL

## 2020-12-10 VITALS
WEIGHT: 143 LBS | HEIGHT: 64 IN | RESPIRATION RATE: 16 BRPM | TEMPERATURE: 97.8 F | HEART RATE: 84 BPM | BODY MASS INDEX: 24.41 KG/M2 | DIASTOLIC BLOOD PRESSURE: 76 MMHG | SYSTOLIC BLOOD PRESSURE: 111 MMHG

## 2020-12-10 PROBLEM — M23.91 ACUTE INTERNAL DERANGEMENT OF RIGHT KNEE: Status: ACTIVE | Noted: 2020-12-10

## 2020-12-10 PROBLEM — S42.133A: Status: ACTIVE | Noted: 2020-12-10

## 2020-12-10 PROCEDURE — 73610 X-RAY EXAM OF ANKLE: CPT

## 2020-12-10 PROCEDURE — 99212 OFFICE O/P EST SF 10 MIN: CPT | Performed by: ORTHOPAEDIC SURGERY

## 2020-12-10 PROCEDURE — 73030 X-RAY EXAM OF SHOULDER: CPT

## 2020-12-10 PROCEDURE — 99214 OFFICE O/P EST MOD 30 MIN: CPT | Performed by: ORTHOPAEDIC SURGERY

## 2020-12-10 RX ORDER — SENNA PLUS 8.6 MG/1
1 TABLET ORAL DAILY
COMMUNITY

## 2020-12-10 NOTE — PROGRESS NOTES
Orthopaedic Office Note        Shaun Cole is a 56 y.o. female, her YOB: 1960 with the following history as recorded in [facility]:    Patient Active Problem List    Diagnosis Date Noted    IC (interstitial cystitis) 08/31/2015     Priority: Medium    Acute internal derangement of right knee 12/10/2020    Fracture of coracoid process of scapula 12/10/2020    Closed fracture of ankle     Trauma 11/25/2020    Near syncope     Allergic rhinitis 05/19/2015    Alcohol abuse 05/05/2015    Tobacco use disorder 01/12/2015    Depressive disorder, not elsewhere classified 12/09/2014    Vitamin D deficiency 10/29/2014    Irritable bowel syndrome 09/05/2013    Chronic interstitial cystitis 12/12/2012    Chronic airway obstruction, not elsewhere classified 08/08/2011    Essential hypertension, benign 08/08/2011    Degeneration of lumbar or lumbosacral intervertebral disc 08/08/2011    Anxiety disorder due to medical condition 08/08/2011     Current Outpatient Medications   Medication Sig Dispense Refill    senna (SENEXON) 8.6 MG tablet Take 1 tablet by mouth daily      methocarbamol (ROBAXIN-750) 750 MG tablet Take 1 tablet by mouth 3 times daily 90 tablet 0    acetaminophen (TYLENOL) 500 MG tablet Take 1 tablet by mouth 3 times daily as needed for Pain Do not exceed 4000 mg of acetaminophen/day 360 tablet 1    oxyCODONE-acetaminophen (PERCOCET) 5-325 MG per tablet Take 1 tablet by mouth every 6 hours as needed for Pain for up to 7 days. Intended supply: 7 days.  Take lowest dose possible to manage pain 28 tablet 0    sennosides-docusate sodium (SENOKOT-S) 8.6-50 MG tablet Take 2 tablets by mouth daily 60 tablet 0    folic acid (FOLVITE) 1 MG tablet Take 1 mg by mouth daily      Cyanocobalamin (VITAMIN B 12 PO) Take 1 tablet by mouth daily      ADVAIR DISKUS 250-50 MCG/DOSE AEPB inhale 1 dose by mouth twice a day 1 Inhaler 0    metoprolol succinate (TOPROL XL) 25 MG extended release conditions classified elsewhere 2011    Anxiety state, unspecified 2013    Atrial fibrillation ([facility] [address] 10/20/2011    Chronic airway obstruction, not elsewhere classified 2011    Chronic interstitial cystitis 2012    Degeneration of lumbar or lumbosacral intervertebral disc 2011    Depressive disorder, not elsewhere classified 2014    Essential hypertension, benign 2011    Irritable bowel syndrome 2013    Tobacco use disorder 2015    Unspecified vitamin D deficiency 10/29/2014     Past Surgical History:   Procedure Laterality Date    APPENDECTOMY      BREAST SURGERY      CARDIAC CATHETERIZATION      years ago patient stated had some blockages physician could not treat     SECTION      CHOLECYSTECTOMY      KNEE ARTHROSCOPY      NECK SURGERY      metal plate    OTHER SURGICAL HISTORY  2020    tilt table     Family History   Problem Relation Age of Onset    Heart Disease Mother     High Blood Pressure Mother     Other Mother         Hypothyroidism    Thyroid Disease Mother     Cancer Father     Heart Disease Father     High Blood Pressure Father      Social History     Tobacco Use    Smoking status: Current Every Day Smoker     Packs/day: 1.00    Smokeless tobacco: Never Used   Substance Use Topics    Alcohol use: No                           Review of Systems   Constitutional: Negative for fever and chills. HENT: Negative for ear pain, sore throat and sinus pressure. Eyes: Negative for pain, discharge and redness. Respiratory: Negative for cough, shortness of breath and wheezing. Cardiovascular: Negative for chest pain. Gastrointestinal: Negative for nausea, vomiting, diarrhea and abdominal distention. Genitourinary: Negative for dysuria and frequency. Musculoskeletal: Negative for back pain and arthralgias. All other systems reviewed and negative.     CC: Polytrauma     S: Selvin Thayer is a 56 y.o. who is here today for hospital follow-up for a multitude of injuries. DOI:11/25/20. Patient apparently fell down stairs injuring the right shoulder with fracture of the coracoid process, sustained bilateral medial malleolus avulsion type fractures with stable more T's and had significant pain to the right knee as well as effusion and positive Lachman concerning for ACL injury. Patient states her pain is improving. She has been alternating with narcotics and plain Tylenol as well as been using Robaxin. States she is recently ran out of oxycodone but does have a prescription for Percocet. Upon further questioning patient states that she has had chronic issues with the bilateral knees due to OA and RA and does recall having instability and issues with the right knee and even remembers wearing a supportive brace years ago. States that she feels that the instability of the knee is likely chronic although she did reaggravated during her recent fall and this continues to be her main pain generator today. She is been in a knee immobilizer. She is been able to weight-bear in her walking boot with her front wheeled walker has been using a sling for the shoulder. Does admit to doing basic chores at the house such as cleaning her kitchen and bathrooms but largely using the left shoulder. Denies any chest pain or shortness of breath or calf pain. Denies any numbness or tingling.     Physical Exam  /76 (Site: Left Upper Arm, Position: Sitting)   Pulse 84   Temp 97.8 °F (36.6 °C) (Oral)   Resp 16   Ht 5' 4\" (1.626 m)   Wt 143 lb (64.9 kg)   BMI 24.55 kg/m²   O:   CONSTITUTIONAL: awake, alert, cooperative, no apparent distress, and appears stated age  EYES: Lids and lashes normal, pupils equal, round and reactive to light, extra ocular muscles intact, sclera clear, conjunctiva normal  ENT: Normocephalic, without obvious abnormality, atraumatic, external ears without lesions, oral pharynx with moist mucus membranes  NECK: Trachea midline, skin normal  LUNGS: No increased work of breathing, good air exchange  CARDIOVASCULAR: 2+ pulses throughout and capillary Refill less than 2 seconds  ABDOMEN: soft, non-distended, non-tender and no rebound tenderness or guarding  NEUROLOGIC: Awake, alert, oriented to name, place and time. Cranial nerves II-XII are grossly intact. Motor is 5 out of 5 bilaterally. Sensory is intact. Right Upper Extremity Exam:  Overlying skin is intact, mild residual swelling about the shoulder and ecchymosis  There is tenderness to the coracoid process focally  Does demonstrate active ROM of the shoulder with some discomfort  Has good active ROM elbow wrist and hand  Palpable distal pulses, cap refill brisk in all digits. Demonstrates active range of motion of all digits. Motor function intact to anterior interosseous/posterior interosseous/ulnar/axillary distributions  Sensation intact to touch in radial/ulnar/median nerve distributions to hand. Compartments supple throughout arm and forearm. Right Lower Extremity Exam:  Skin intact, moderate knee effusion present, focal medial joint line tenderness to the knee  Knee stable varus and valgus stress, positive Lachman, negative posterior drawer  Unable to assess Allan's or Apley's compression due to discomfort  Passive ROM 5-80 degrees before discomfort  Demonstrates active knee flexion/extension, ankle plantar/dorsiflexion/great toe extension. There is focal tenderness to the medial malleolus about the ankle, no lateral tenderness  States sensation intact to gross touch in sural/deep peroneal/superficial peroneal/saphenous/posterior tibial nerve distributions to foot/ankle. Palpable dorsalis pedis and posterior tibialis pulses, cap refill brisk in toes, foot warm/perfused.   Compartments supple throughout thigh and leg, calves supple/NT    Left Lower Extremity Exam:  Skin intact  Knee stable to varus and valgus stress, negative effusion, minimal medial joint line tenderness  Demonstrates active knee flexion/extension, ankle plantar/dorsiflexion/great toe extension. There is focal tenderness to the medial malleolus about the ankle, no lateral tenderness  States sensation intact to gross touch in sural/deep peroneal/superficial peroneal/saphenous/posterior tibial nerve distributions to foot/ankle. Palpable dorsalis pedis and posterior tibialis pulses, cap refill brisk in toes, foot warm/perfused. Compartments supple throughout thigh and leg, calves supple/NT    Xrays Reviewed  Right coracoid process fracture without interval change in alignment. Bilateral medial malleolus fracture is also without any interval change in alignment, mortise are aligned and appear stable to the bilateral ankles.     ASSESSMENT:  Polytrauma  Right coracoid process fracture  Right knee instability, ACL injury  Right medial malleolus fracture  Left medial malleolus fracture    PLAN:   Patient to continue with her sling and nonweightbearing through the right shoulder, discussion may remove the sling for active ROM of elbow wrist and hand and continue with passive ROM of the shoulder  She can continue with her bilateral walking boots and be weightbearing to the bilateral lower extremities  Prescription provided for custom right knee supportive brace due to the sagittal plane laxity, discussed if she is unable to afford this would consider purchasing one online  She is to wear supportive brace anytime she is upright or ambulating, she is to continue with her wheeled walker  We will order an MRI of the right knee to evaluate for intra-articular injury, she prefers to do an MRI at a facility that is not a hospital based due to current Covid  Patient follow-up in office in approximately 4 weeks for repeat evaluation  We also discussed pain control, she will be alternating Tylenol with Percocet and weaning off her Robaxin  Continue with aspirin for DVT

## 2020-12-10 NOTE — PATIENT INSTRUCTIONS
MRI ordered. Scheduling will call you to set up appointment. If they do not contact you, their number is [phone]    Order for knee brace sent to [facility]. If too expensive, order brace online with metal support on each side of knee.     [facility]  [address] [facility], [facility] [place], [address]  P: [phone]  F: [phone]    Follow up in 4 weeks  Call with questions or concerns    Call [phone] for medication refills

## 2020-12-16 ENCOUNTER — TELEPHONE (OUTPATIENT)
Dept: ORTHOPEDIC SURGERY | Age: 60
End: 2020-12-16

## 2020-12-16 RX ORDER — OXYCODONE HYDROCHLORIDE 5 MG/1
5 TABLET ORAL EVERY 6 HOURS PRN
Qty: 28 TABLET | Refills: 0 | Status: SHIPPED
Start: 2020-12-16 | End: 2020-12-22 | Stop reason: SDUPTHER

## 2020-12-16 NOTE — TELEPHONE ENCOUNTER
Patient called office requesting advice. Patient had questions regarding boot and treatment plan. Spent extensive time educating patient on ice, elevation, change of position, medications, etc.    Patient experiencing neuropathic pain, requesting referral to neurosurgery/ortho spine. Provided patient with Dr. Dariel Tabor name/phone number. Referral pended. Patient requesting refill on pain medication. She is taking 500 mg acetaminophen for pain control and is requesting Roxicodone instead of percocet. Pended and routed. Patient also having issues with Aircast boots that were given to patient in hospital. She said they are too tight and unable to be deflated. Requesting to come in to be fitted for new boots. Appointment scheduled at this time. Encouraged patient to call with questions or concerns. Also provided phone number to schedule MRI knee that was ordered at last office visit.       Future Appointments   Date Time Provider Mayco Magallanes   12/18/2020 12:30 PM SE CYNDY Nair APC  [facility]th[facility]   1/7/2021 11:15 AM Randy Jimenez DO SE [facility]

## 2020-12-16 NOTE — TELEPHONE ENCOUNTER
Tyler Serrano is 3 weeks from the following Injury:    · Injury: Right medial malleolus fracture  · Left medial malleolus fracture  · Right coracoid fracture  Right knee strain - possible ACL tear  Date of Injury: 11/25/2020    OARRS report reviewed  Last RX filled on 12/8/2020  Plan for wean: wean with next RX    Controlled Substance Monitoring:    Acute and Chronic Pain Monitoring:   RX Monitoring 12/16/2020   Attestation -   Periodic Controlled Substance Monitoring No signs of potential drug abuse or diversion identified.         Electronically signed by Iman Nails PA-C on 12/16/2020 at 3:16 PM

## 2020-12-18 ENCOUNTER — OFFICE VISIT (OUTPATIENT)
Dept: ORTHOPEDIC SURGERY | Age: 60
End: 2020-12-18
Payer: COMMERCIAL

## 2020-12-18 PROCEDURE — L4350 ANKLE CONTROL ORTHO PRE OTS: HCPCS | Performed by: PHYSICIAN ASSISTANT

## 2020-12-18 PROCEDURE — 99212 OFFICE O/P EST SF 10 MIN: CPT | Performed by: PHYSICIAN ASSISTANT

## 2020-12-18 NOTE — PROGRESS NOTES
Patient arrived to office in Aircast boots. She complains of the boots not fitting appropriately and being too tight. Her home therapist attempted to deflate boot but was unable to adjust boots. Patient also requesting new sling. Verbal order from Tahmina Hernandez PA-C to fit patient for bilateral Maxtrax walking boots as well as [id number]-roll. Patient fitted at this time, verbalizes improved comfort. Patient ambulating with wheeled walker without difficulty. She is requesting an order for quad cane as mentioned by physical therapy. Encouraged patient to call with questions or concerns. Follow up at scheduled visit.     Future Appointments   Date Time Provider Mayco Magallanes   1/7/2021 11:15 AM [address] Crowder DO SE [facility]

## 2020-12-18 NOTE — PROGRESS NOTES
Agree with Monique Covington RN in note below. Bilateral walking boots given to patient to replace air casts and jlsxt-sd-m-roll given to patient today. Follow up in office for appt as stated below for further eval. Quad cane ordered today as well.       Future Appointments   Date Time Provider Mayco Magallanes   1/7/2021 11:15 AM Robert Ruiz DO SE [facility]

## 2020-12-22 RX ORDER — OXYCODONE HYDROCHLORIDE 5 MG/1
5 TABLET ORAL EVERY 8 HOURS PRN
Qty: 21 TABLET | Refills: 0 | Status: SHIPPED
Start: 2020-12-23 | End: 2020-12-29 | Stop reason: SDUPTHER

## 2020-12-22 NOTE — TELEPHONE ENCOUNTER
Patient left a voice message requesting refill on roxicodone for tomorrow. Polytrauma  Right coracoid process fracture  Right knee instability, ACL injury  Right medial malleolus fracture  Left medial malleolus fracture    Order to be filled tomorrow pended and routed for decision and signature.      [facility]

## 2020-12-22 NOTE — TELEPHONE ENCOUNTER
Oxycodone refilled. First fill date tomorrow, weaned to every 8 hours PRN. Controlled Substance Monitoring:    Acute and Chronic Pain Monitoring:   RX Monitoring 12/22/2020   Attestation -   Periodic Controlled Substance Monitoring No signs of potential drug abuse or diversion identified.

## 2020-12-29 RX ORDER — OXYCODONE HYDROCHLORIDE 5 MG/1
5 TABLET ORAL EVERY 8 HOURS PRN
Qty: 21 TABLET | Refills: 0 | Status: SHIPPED | OUTPATIENT
Start: 2020-12-30 | End: 2021-01-06

## 2020-12-29 NOTE — TELEPHONE ENCOUNTER
Patient called office requesting MRI to be faxed to [facility]. Advised patient to obtain disc and bring to next office appt. Patient also requesting refill on roxicodone. Patient aware refill due tomorrow. Polytrauma  Right coracoid process fracture  Right knee instability, ACL injury  Right medial malleolus fracture  Left medial malleolus fracture    Order pended and routed for decision and signature.      Ph[facility]acy: [facility]

## 2020-12-29 NOTE — TELEPHONE ENCOUNTER
Antony Adams is 4 weeks from the following Trauma:    Trauma:Polytrauma  Right coracoid process fracture  Right knee instability, ACL injury  Right medial malleolus fracture  Left medial malleolus fracture  Date of Trauma: 11/25/2020    OARRS report reviewed  Last RX filled on 12/23/2020  Plan for wean: refill at every 8 hours with this RX    Controlled Substance Monitoring:    Acute and Chronic Pain Monitoring:   RX Monitoring 12/29/2020   Attestation -   Periodic Controlled Substance Monitoring No signs of potential drug abuse or diversion identified.         Electronically signed by Kris Kenney PA-C on 12/29/2020 at 4:13 PM

## 2021-01-05 DIAGNOSIS — S82.892A CLOSED FRACTURE OF LEFT ANKLE, INITIAL ENCOUNTER: ICD-10-CM

## 2021-01-05 DIAGNOSIS — S42.134A CLOSED NONDISPLACED FRACTURE OF CORACOID PROCESS OF RIGHT SHOULDER, INITIAL ENCOUNTER: ICD-10-CM

## 2021-01-05 DIAGNOSIS — S82.891A CLOSED FRACTURE OF RIGHT ANKLE, INITIAL ENCOUNTER: Primary | ICD-10-CM

## 2021-01-07 ENCOUNTER — HOSPITAL ENCOUNTER (OUTPATIENT)
Dept: GENERAL RADIOLOGY | Age: 61
Discharge: HOME | End: 2021-01-09
Payer: COMMERCIAL

## 2021-01-07 ENCOUNTER — OFFICE VISIT (OUTPATIENT)
Dept: ORTHOPEDIC SURGERY | Age: 61
End: 2021-01-07
Payer: COMMERCIAL

## 2021-01-07 VITALS — TEMPERATURE: 97.8 F

## 2021-01-07 DIAGNOSIS — S82.891A CLOSED FRACTURE OF RIGHT ANKLE, INITIAL ENCOUNTER: ICD-10-CM

## 2021-01-07 DIAGNOSIS — S42.134A CLOSED NONDISPLACED FRACTURE OF CORACOID PROCESS OF RIGHT SHOULDER, INITIAL ENCOUNTER: ICD-10-CM

## 2021-01-07 DIAGNOSIS — M23.91 ACUTE INTERNAL DERANGEMENT OF RIGHT KNEE: ICD-10-CM

## 2021-01-07 DIAGNOSIS — S42.134A CLOSED NONDISPLACED FRACTURE OF CORACOID PROCESS OF RIGHT SHOULDER, INITIAL ENCOUNTER: Primary | ICD-10-CM

## 2021-01-07 DIAGNOSIS — S82.892A CLOSED FRACTURE OF LEFT ANKLE, INITIAL ENCOUNTER: ICD-10-CM

## 2021-01-07 PROCEDURE — 73610 X-RAY EXAM OF ANKLE: CPT

## 2021-01-07 PROCEDURE — 73030 X-RAY EXAM OF SHOULDER: CPT

## 2021-01-07 PROCEDURE — 99214 OFFICE O/P EST MOD 30 MIN: CPT | Performed by: PHYSICIAN ASSISTANT

## 2021-01-07 RX ORDER — PHENOL 1.4 %
2 AEROSOL, SPRAY (ML) MUCOUS MEMBRANE DAILY
Qty: 30 TABLET | Refills: 3 | Status: SHIPPED
Start: 2021-01-07 | End: 2021-01-13 | Stop reason: SDUPTHER

## 2021-01-07 RX ORDER — ERGOCALCIFEROL 1.25 MG/1
50000 CAPSULE ORAL WEEKLY
Qty: 12 CAPSULE | Refills: 1 | Status: SHIPPED | OUTPATIENT
Start: 2021-01-07

## 2021-01-07 RX ORDER — METHOCARBAMOL 750 MG/1
750 TABLET, FILM COATED ORAL 3 TIMES DAILY
Qty: 90 TABLET | Refills: 0 | Status: SHIPPED | OUTPATIENT
Start: 2021-01-07 | End: 2021-02-06

## 2021-01-07 RX ORDER — ERGOCALCIFEROL 1.25 MG/1
50000 CAPSULE ORAL WEEKLY
COMMUNITY
End: 2021-01-07 | Stop reason: ALTCHOICE

## 2021-01-07 RX ORDER — OXYCODONE HYDROCHLORIDE 5 MG/1
5 TABLET ORAL EVERY 8 HOURS PRN
Qty: 21 TABLET | Refills: 0 | Status: SHIPPED
Start: 2021-01-07 | End: 2021-01-13 | Stop reason: SDUPTHER

## 2021-01-07 RX ORDER — GABAPENTIN 600 MG/1
600 TABLET ORAL 3 TIMES DAILY
COMMUNITY
End: 2021-01-08 | Stop reason: ALTCHOICE

## 2021-01-07 NOTE — PATIENT INSTRUCTIONS
Call the [id number] [place] [address] Physician Referral Line to establish with a Primary Care Provider: [phone]

## 2021-01-07 NOTE — PROGRESS NOTES
Chief Complaint   Patient presents with    Fracture     bilat mal fx, wearing walking boots. WBAT. burning in feet. unsure if she should be on  daily or BID    Results     MRI R knee results    Elbow Injury     R coracoid fracture, wearing sling. Polytrauma  Right coracoid process fracture  Right knee instability, ACL injury  Right medial malleolus fracture  Left medial malleolus fracture    Subjective:  Clearance Huy is approximately 6 weeks from DOI of 11/25/2020. States that she has been full WB bilateral LE while in bilateral walking boots for a few weeks now with minimal pain and states most of her pain which is mild, is over the bilateral medial malleoli, but has been having persistent pain of the L lateral malleoli. States her L shoulder is still stiff and painful and wearing sling today. Says she was receiving home PT/OT and states \"the therapists told me there was nothing to do for me since im so banged up\". States she has a hx of frequent falls, but has thrown out her walker and cane. Says she still wears compression R knee sleeve with collateral support and recently had R knee MRI at [facility] to assess for possible ACL injury. She does still smoke about 0.75 PPD of cigarettes and states she has been smoking more now because of her ankle and shoulder pain. Denies calf pain, CP, SOB, fever, chills.     Review of Systems -    General ROS: negative for - chills, fatigue, fever or night sweats  Respiratory ROS: no cough, shortness of breath, or wheezing  Cardiovascular ROS: no chest pain or dyspnea on exertion  Gastrointestinal ROS: no abdominal pain, nausea, vomiting, diarrhea, constipation,or black or bloody stools  Genitourinary: no hematuria, dysuria, or incontinence   Musculoskeletal ROS: negative for -back or neck pain or stiffness, also see HPI  Neurological ROS: no TIA or stroke symptoms       Objective: Grade 1 strain medial collateral ligament. Small joint effusion possible small Baker's cyst.  Posterior multiloculated cyst adjacent to the proximal joint capsule likely represents ganglionic cyst.  Recommend follow-up postcontrast images for further evaluation. Small chondral lesion in the medial tibial plateau measuring 1.2 cm suggesting enchondroma. Low-grade chondrosarcoma cannot be entirely excluded. Questionable component of lateral femoral condyle friction syndrome. Discussed with Dr. Starr Farias - call placed to patient today to review results and will order R knee MRI with contrast at this point for further evaluation. Patient verbalizes understanding and all questions were answered. States she has had chronic R knee pain even before her DOI 6 weeks ago that we are following her for and says she has had episodes of her knee buckling and giving out chronically. Patient states she tried transitioning to high top tennis shoes yesterday while weight bearing and this provided adequate report and states she had a full night's sleep last night, which she has not had for a long time. Patient is agreeable to MRI with contrast performed at [facility] and will bring in CD at next OV. Next OV will be changed to 3 weeks from now and patient will call to push back this appt if unable to receive MRI in time. Electronically signed by Black Peng PA-C on 1/7/2021 at 12:23 PM  Note: This report was completed using [employer] voiced recognition software. Every effort has been made to ensure accuracy; however, inadvertent computerized transcription errors may be present.

## 2021-01-08 DIAGNOSIS — M23.91 ACUTE INTERNAL DERANGEMENT OF RIGHT KNEE: ICD-10-CM

## 2021-01-08 DIAGNOSIS — S82.891A CLOSED FRACTURE OF RIGHT ANKLE, INITIAL ENCOUNTER: Primary | ICD-10-CM

## 2021-01-08 DIAGNOSIS — S82.892A CLOSED FRACTURE OF LEFT ANKLE, INITIAL ENCOUNTER: ICD-10-CM

## 2021-01-08 DIAGNOSIS — S42.134A CLOSED NONDISPLACED FRACTURE OF CORACOID PROCESS OF RIGHT SHOULDER, INITIAL ENCOUNTER: ICD-10-CM

## 2021-01-08 RX ORDER — GABAPENTIN 300 MG/1
900 CAPSULE ORAL 3 TIMES DAILY
Qty: 90 CAPSULE | Refills: 0 | Status: SHIPPED
Start: 2021-01-08 | End: 2021-01-20

## 2021-01-08 NOTE — TELEPHONE ENCOUNTER
Patient left a voice message requesting refill on gabapentin. Patient stated Dr. Lesli Jeong said gabapentin can be increased from 600 to 900 mg. Order pended and routed for decision and signature.      [facility] [place]      Future Appointments   Date Time Provider Mayco Magallanes   2/18/2021 11:00  [facility]

## 2021-01-08 NOTE — TELEPHONE ENCOUNTER
Controlled Substance Monitoring:    Acute and Chronic Pain Monitoring:   RX Monitoring 1/8/2021   Attestation -   Periodic Controlled Substance Monitoring No signs of potential drug abuse or diversion identified. Gabapentin refilled and increased to 900mg TID due to recent polytrauma and subsequent increase in pain.

## 2021-01-11 ENCOUNTER — TELEPHONE (OUTPATIENT)
Dept: ORTHOPEDIC SURGERY | Age: 61
End: 2021-01-11

## 2021-01-11 NOTE — TELEPHONE ENCOUNTER
No, we need with contrast to evaluate what was read on the [facility] MRI that was recently completed.    Electronically signed by Gomez Romo PA-C on 1/11/2021 at 2:21 PM

## 2021-01-11 NOTE — TELEPHONE ENCOUNTER
Call returned to [facility]. Advised Shikha That MRI  with contrast is needed. Shikha verbalized understanding. Results of MRI R knee without contrast: \"Small chondral lesion in the medial tibial plateau measuring 1.2 cm suggesting enchondroma. Low-grade chondrosarcoma cannot be entirely excluded. \"     Encouraged to call with questions or concerns.

## 2021-01-13 DIAGNOSIS — S82.891D CLOSED FRACTURE OF RIGHT ANKLE WITH ROUTINE HEALING, SUBSEQUENT ENCOUNTER: ICD-10-CM

## 2021-01-13 DIAGNOSIS — S82.892D CLOSED FRACTURE OF LEFT ANKLE WITH ROUTINE HEALING, SUBSEQUENT ENCOUNTER: ICD-10-CM

## 2021-01-13 DIAGNOSIS — S42.134D CLOSED NONDISPLACED FRACTURE OF CORACOID PROCESS OF RIGHT SHOULDER WITH ROUTINE HEALING, SUBSEQUENT ENCOUNTER: ICD-10-CM

## 2021-01-13 RX ORDER — PHENOL 1.4 %
2 AEROSOL, SPRAY (ML) MUCOUS MEMBRANE DAILY
Qty: 60 TABLET | Refills: 0 | Status: SHIPPED | OUTPATIENT
Start: 2021-01-13 | End: 2022-02-25

## 2021-01-13 RX ORDER — OXYCODONE HYDROCHLORIDE 5 MG/1
5 TABLET ORAL EVERY 12 HOURS PRN
Qty: 14 TABLET | Refills: 0 | Status: SHIPPED
Start: 2021-01-13 | End: 2021-01-20 | Stop reason: SDUPTHER

## 2021-01-13 NOTE — TELEPHONE ENCOUNTER
Pt sent refill request for Oxy, also had questions about Neurontin and Calcium. Call placed to pt, informed her Neurontin prescribed was for only 10 days, that's why she only got 90 tablets. Furthermore, Calcium quantity was an error. Will send for refill of this as well. Pt verbalized understanding, questions answered. Polytrauma  Right coracoid process fracture  Right knee instability, ACL injury  Right medial malleolus fracture  Left medial malleolus fracture    Orders pended and routed for decision and signature.

## 2021-01-20 DIAGNOSIS — S42.134A CLOSED NONDISPLACED FRACTURE OF CORACOID PROCESS OF RIGHT SHOULDER, INITIAL ENCOUNTER: ICD-10-CM

## 2021-01-20 DIAGNOSIS — S82.892A CLOSED FRACTURE OF LEFT ANKLE, INITIAL ENCOUNTER: ICD-10-CM

## 2021-01-20 DIAGNOSIS — S82.891A CLOSED FRACTURE OF RIGHT ANKLE, INITIAL ENCOUNTER: ICD-10-CM

## 2021-01-20 DIAGNOSIS — S42.134D CLOSED NONDISPLACED FRACTURE OF CORACOID PROCESS OF RIGHT SHOULDER WITH ROUTINE HEALING, SUBSEQUENT ENCOUNTER: ICD-10-CM

## 2021-01-20 DIAGNOSIS — S82.892D CLOSED FRACTURE OF LEFT ANKLE WITH ROUTINE HEALING, SUBSEQUENT ENCOUNTER: ICD-10-CM

## 2021-01-20 DIAGNOSIS — S82.891D CLOSED FRACTURE OF RIGHT ANKLE WITH ROUTINE HEALING, SUBSEQUENT ENCOUNTER: ICD-10-CM

## 2021-01-20 DIAGNOSIS — M23.91 ACUTE INTERNAL DERANGEMENT OF RIGHT KNEE: ICD-10-CM

## 2021-01-20 RX ORDER — OXYCODONE HYDROCHLORIDE 5 MG/1
5 TABLET ORAL DAILY PRN
Qty: 7 TABLET | Refills: 0 | Status: SHIPPED | OUTPATIENT
Start: 2021-01-20 | End: 2021-01-27

## 2021-01-20 RX ORDER — GABAPENTIN 300 MG/1
600 CAPSULE ORAL 4 TIMES DAILY
Qty: 240 CAPSULE | Refills: 0 | Status: SHIPPED | OUTPATIENT
Start: 2021-01-20 | End: 2022-02-25

## 2021-01-20 RX ORDER — GABAPENTIN 300 MG/1
900 CAPSULE ORAL 3 TIMES DAILY
Qty: 90 CAPSULE | Refills: 0 | Status: CANCELLED | OUTPATIENT
Start: 2021-01-20 | End: 2021-01-30

## 2021-01-20 NOTE — TELEPHONE ENCOUNTER
Patient left a voice message requesting refill on roxicodone and gabapentin    Polytrauma  Right coracoid process fracture  Right knee instability, ACL injury  Right medial malleolus fracture  Left medial malleolus fracture    Order pended and routed for decision and signature. [facility]acy: [facility]    Per last refill note 01/13/21, \"Weaned to BID.  Continue to wean with each subsequent refill\"

## 2021-01-21 ENCOUNTER — TELEPHONE (OUTPATIENT)
Dept: ORTHOPEDIC SURGERY | Age: 61
End: 2021-01-21

## 2021-01-21 NOTE — TELEPHONE ENCOUNTER
Future Appointments   Date Time Provider Mayco Magallanes   2/18/2021 11:00  [facility]     Call placed to patient today to review MRI results. All questions were answered. Patient will follow up in office on above date.

## 2021-01-21 NOTE — TELEPHONE ENCOUNTER
Incoming fax from [facility] with results from MRI Right knee with contrast 1-. Scanned in to Epic.     Electronically signed by Johnie Castaneda LPN on 1/53/4941 at 9:89 PM

## 2021-02-12 DIAGNOSIS — S42.134A CLOSED NONDISPLACED FRACTURE OF CORACOID PROCESS OF RIGHT SHOULDER, INITIAL ENCOUNTER: Primary | ICD-10-CM

## 2021-02-12 DIAGNOSIS — S82.892D CLOSED FRACTURE OF LEFT ANKLE WITH ROUTINE HEALING, SUBSEQUENT ENCOUNTER: ICD-10-CM

## 2021-02-12 DIAGNOSIS — S82.891D CLOSED FRACTURE OF RIGHT ANKLE WITH ROUTINE HEALING, SUBSEQUENT ENCOUNTER: ICD-10-CM

## 2021-02-16 ENCOUNTER — TELEPHONE (OUTPATIENT)
Dept: ADMINISTRATIVE | Age: 61
End: 2021-02-16

## 2021-02-16 NOTE — TELEPHONE ENCOUNTER
Patient calling to reschedule her appointment that she has on Thursday for \"Hospital f/u 11/25 bilat mal fx's, R coracoid fracture; JVG\". States that with her medical condition, she doesn't want to come out in this weather, but does need to get in ASAP. Please advise on scheduling.

## 2021-02-25 ENCOUNTER — HOSPITAL ENCOUNTER (OUTPATIENT)
Dept: GENERAL RADIOLOGY | Age: 61
Discharge: HOME | End: 2021-02-27
Payer: COMMERCIAL

## 2021-02-25 ENCOUNTER — OFFICE VISIT (OUTPATIENT)
Dept: ORTHOPEDIC SURGERY | Age: 61
End: 2021-02-25
Payer: COMMERCIAL

## 2021-02-25 VITALS — TEMPERATURE: 98.2 F

## 2021-02-25 DIAGNOSIS — S82.899G: ICD-10-CM

## 2021-02-25 DIAGNOSIS — S42.134A CLOSED NONDISPLACED FRACTURE OF CORACOID PROCESS OF RIGHT SHOULDER, INITIAL ENCOUNTER: ICD-10-CM

## 2021-02-25 DIAGNOSIS — S82.832D OTHER CLOSED FRACTURE OF PROXIMAL END OF LEFT FIBULA WITH ROUTINE HEALING, SUBSEQUENT ENCOUNTER: ICD-10-CM

## 2021-02-25 DIAGNOSIS — M25.562 LEFT KNEE PAIN, UNSPECIFIED CHRONICITY: ICD-10-CM

## 2021-02-25 DIAGNOSIS — S82.892D CLOSED FRACTURE OF LEFT ANKLE WITH ROUTINE HEALING, SUBSEQUENT ENCOUNTER: ICD-10-CM

## 2021-02-25 DIAGNOSIS — S82.891D CLOSED FRACTURE OF RIGHT ANKLE WITH ROUTINE HEALING, SUBSEQUENT ENCOUNTER: ICD-10-CM

## 2021-02-25 DIAGNOSIS — M25.562 LEFT KNEE PAIN, UNSPECIFIED CHRONICITY: Primary | ICD-10-CM

## 2021-02-25 PROCEDURE — 99212 OFFICE O/P EST SF 10 MIN: CPT | Performed by: ORTHOPAEDIC SURGERY

## 2021-02-25 PROCEDURE — 73560 X-RAY EXAM OF KNEE 1 OR 2: CPT

## 2021-02-25 PROCEDURE — 73030 X-RAY EXAM OF SHOULDER: CPT

## 2021-02-25 PROCEDURE — 73610 X-RAY EXAM OF ANKLE: CPT

## 2021-02-25 PROCEDURE — 99214 OFFICE O/P EST MOD 30 MIN: CPT | Performed by: ORTHOPAEDIC SURGERY

## 2021-02-25 RX ORDER — METHOCARBAMOL 500 MG/1
500 TABLET, FILM COATED ORAL 3 TIMES DAILY
Qty: 30 TABLET | Refills: 0 | Status: SHIPPED | OUTPATIENT
Start: 2021-02-25 | End: 2021-03-07

## 2021-02-25 NOTE — PATIENT INSTRUCTIONS
MRI right shoulder ordered    - please have impression faxed to [phone]    - please have disc delivered to office at [address] Abdulkadir Jose [facility] physical therapy ordered  [phone]    Continue supportive tennis shoes    Follow up in 6 weeks  Call with questions or concerns    Please call the office at [phone] or send [employer] message to providers sooner with any questions or concerns  Strongly recommend all of our patients sign up for [employer] in order to have direct communication VIA [employer] KACIE with our clinic staff.

## 2021-02-25 NOTE — PROGRESS NOTES
Yvonna Hammans is a 56 y.o. female who presents for follow up of Bilat Mal fx, R coacoid fx. SURGEON: Dr. Nessa Storm DO  Date of Injury/Surgery: 11/25  Date last seen in office: 1/7    Symptoms: better  New complaints: Patient states after she removes her braces for her knees she does well up. Cast/Splint, Brace, or Dressings: Well fitting and Previously removed by patient    Weightbearing: Patient is WBAT. Assistive device No Device  Participating in therapy (location if yes)? no    Refills Needed: Robaxin (methocarbamol) would like to talk about a pain medication, sees PCP on 3/15.   Order/Referral Needed: no
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
## 2021-04-15 ENCOUNTER — HOSPITAL ENCOUNTER (OUTPATIENT)
Dept: MRI IMAGING | Age: 61
Discharge: HOME | End: 2021-04-17
Payer: COMMERCIAL

## 2021-04-15 ENCOUNTER — APPOINTMENT (OUTPATIENT)
Dept: ORTHOPEDIC SURGERY | Age: 61
End: 2021-04-15
Payer: COMMERCIAL

## 2021-04-15 DIAGNOSIS — S42.134A CLOSED NONDISPLACED FRACTURE OF CORACOID PROCESS OF RIGHT SHOULDER, INITIAL ENCOUNTER: ICD-10-CM

## 2021-04-15 PROCEDURE — 73221 MRI JOINT UPR EXTREM W/O DYE: CPT

## 2021-04-16 DIAGNOSIS — S82.891D CLOSED FRACTURE OF RIGHT ANKLE WITH ROUTINE HEALING, SUBSEQUENT ENCOUNTER: Primary | ICD-10-CM

## 2021-04-16 DIAGNOSIS — S82.892D CLOSED FRACTURE OF LEFT ANKLE WITH ROUTINE HEALING, SUBSEQUENT ENCOUNTER: ICD-10-CM

## 2021-04-21 ENCOUNTER — TELEPHONE (OUTPATIENT)
Dept: ADMINISTRATIVE | Age: 61
End: 2021-04-21

## 2021-04-21 NOTE — TELEPHONE ENCOUNTER
Keep the appointment because we will be evaluating her bilateral ankle fractures, R knee, and R shoulder again. We are getting repeat XR of bilateral ankles tomorrow. You can tell her that the MRI of the R shoulder did not show any full thickness tearing of any part of the rotator cuff. Tiny tearing of supraspinatus and infraspinatus tendons, but everything is still intact. Mild shoulder arthritis.

## 2021-04-21 NOTE — TELEPHONE ENCOUNTER
Patient states she is moving at the end of the month and can not come in tomorrow due to having people coming to her house to view it. Patient would like to come in next Thursday if possible. Let patient know her MRI results \"MRI of the R shoulder did not show any full thickness tearing of any part of the rotator cuff. Tiny tearing of supraspinatus and infraspinatus tendons, but everything is still intact. Mild shoulder arthritis. \". Patient verbalized understanding and is agreeable with plan. Routed to providers for consideration and scheduling recommendations.

## 2021-04-21 NOTE — TELEPHONE ENCOUNTER
pt would like to cancel her appt on 4/22 and just receive a phone call for the MRI results. .. Susi Romero  please advise thank you

## 2021-04-23 ENCOUNTER — TELEPHONE (OUTPATIENT)
Dept: ORTHOPEDIC SURGERY | Age: 61
End: 2021-04-23

## 2021-04-23 NOTE — TELEPHONE ENCOUNTER
Patient was a no show for recent appointment on 04/22. Tried calling patient to reschedule appointment to next available, but unable to leave a message, voice message box was full.             Electronically signed by Ivonne Hermosillo MA on 4/23/2021 at 12:06 PM

## 2021-05-20 ENCOUNTER — OFFICE VISIT (OUTPATIENT)
Dept: ORTHOPEDIC SURGERY | Age: 61
End: 2021-05-20
Payer: COMMERCIAL

## 2021-05-20 ENCOUNTER — HOSPITAL ENCOUNTER (OUTPATIENT)
Dept: GENERAL RADIOLOGY | Age: 61
Discharge: HOME | End: 2021-05-22
Payer: COMMERCIAL

## 2021-05-20 VITALS — WEIGHT: 143 LBS | TEMPERATURE: 98.9 F | BODY MASS INDEX: 24.41 KG/M2 | HEIGHT: 64 IN

## 2021-05-20 DIAGNOSIS — S82.52XD CLOSED DISPLACED FRACTURE OF MEDIAL MALLEOLUS OF LEFT TIBIA WITH ROUTINE HEALING: ICD-10-CM

## 2021-05-20 DIAGNOSIS — S46.811D: ICD-10-CM

## 2021-05-20 DIAGNOSIS — S83.411D SPRAIN OF MEDIAL COLLATERAL LIGAMENT OF RIGHT KNEE, SUBSEQUENT ENCOUNTER: ICD-10-CM

## 2021-05-20 DIAGNOSIS — S82.892D CLOSED FRACTURE OF LEFT ANKLE WITH ROUTINE HEALING, SUBSEQUENT ENCOUNTER: ICD-10-CM

## 2021-05-20 DIAGNOSIS — S46.811D INFRASPINATUS STRAIN, RIGHT, SUBSEQUENT ENCOUNTER: ICD-10-CM

## 2021-05-20 DIAGNOSIS — S82.891D CLOSED FRACTURE OF RIGHT ANKLE WITH ROUTINE HEALING, SUBSEQUENT ENCOUNTER: ICD-10-CM

## 2021-05-20 DIAGNOSIS — S42.134D CLOSED NONDISPLACED FRACTURE OF CORACOID PROCESS OF RIGHT SHOULDER WITH ROUTINE HEALING, SUBSEQUENT ENCOUNTER: Primary | ICD-10-CM

## 2021-05-20 PROBLEM — S83.411A MCL SPRAIN OF RIGHT KNEE: Status: ACTIVE | Noted: 2021-05-20

## 2021-05-20 PROCEDURE — 73610 X-RAY EXAM OF ANKLE: CPT

## 2021-05-20 PROCEDURE — 99212 OFFICE O/P EST SF 10 MIN: CPT | Performed by: PHYSICIAN ASSISTANT

## 2021-05-20 PROCEDURE — 99214 OFFICE O/P EST MOD 30 MIN: CPT | Performed by: PHYSICIAN ASSISTANT

## 2021-05-20 NOTE — PROGRESS NOTES
Chief Complaint   Patient presents with    Results     MRI right shoulder         Subjective:  Nicholas Roca is approximately 6 months from nonoperative management of right coracoid process fracture, bilateral medial malleoli fractures, right knee MCL sprain, right shoulder infraspinatus and supraspinatus strain. States that she just moved into a new house without any steps in it and has been busy with the moving process and has been very active at home, full weightbearing right upper extremity and bilateral lower extremities without any assistive devices. No new injuries or any other orthopedic complaints. States that she does sometimes have intermittent bilateral ankle pain however this resolves spontaneously. Some global, generalized right shoulder pain worse with activity and better with rest however this is tolerable without any medications. Some bilateral knee pain as well, worse with activity and better with rest but this is tolerable. Overall states that she is very happy with her outcomes. Denies paresthesias greater than her baseline. Denies any mechanical symptoms of the right knee. Review of Systems -    General ROS: negative for - chills, fatigue, fever or night sweats  Respiratory ROS: no cough, shortness of breath, or wheezing  Cardiovascular ROS: no chest pain or dyspnea on exertion  Gastrointestinal ROS: no abdominal pain, nausea, vomiting, diarrhea, constipation,or black or bloody stools  Genitourinary: no hematuria, dysuria, or incontinence   Musculoskeletal ROS: negative for -back or neck pain or stiffness, also see HPI  Neurological ROS: no TIA or stroke symptoms       Objective:    General: Alert and oriented X 3, normocephalic atraumatic, external ears and eye normal, sclera clear, no acute distress, respirations easy and unlabored with no audible wheezes, skin warm and dry, speech and dress appropriate for noted age, affect euthymic.     Extremity:  Bilateral Lower

## 2021-05-20 NOTE — PROGRESS NOTES
Patient here today for R coracoid fx, MRI results. Patient states that she has chronic pain in her right shoulder.              Electronically signed by Viviana Ng MA on 5/20/2021 at 2:12 PM

## 2022-12-30 ENCOUNTER — APPOINTMENT (OUTPATIENT)
Dept: CT IMAGING | Age: 62
End: 2022-12-30
Payer: COMMERCIAL

## 2022-12-30 ENCOUNTER — HOSPITAL ENCOUNTER (EMERGENCY)
Age: 62
Discharge: HOME | End: 2022-12-30
Attending: EMERGENCY MEDICINE
Payer: COMMERCIAL

## 2022-12-30 ENCOUNTER — APPOINTMENT (OUTPATIENT)
Dept: GENERAL RADIOLOGY | Age: 62
End: 2022-12-30
Payer: COMMERCIAL

## 2022-12-30 VITALS
BODY MASS INDEX: 25.61 KG/M2 | HEART RATE: 54 BPM | WEIGHT: 150 LBS | TEMPERATURE: 97.8 F | DIASTOLIC BLOOD PRESSURE: 90 MMHG | SYSTOLIC BLOOD PRESSURE: 162 MMHG | OXYGEN SATURATION: 96 % | RESPIRATION RATE: 16 BRPM | HEIGHT: 64 IN

## 2022-12-30 DIAGNOSIS — S22.31XA CLOSED FRACTURE OF ONE RIB OF RIGHT SIDE, INITIAL ENCOUNTER: ICD-10-CM
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
## 2022-12-30 DIAGNOSIS — S09.90XA CLOSED HEAD INJURY, INITIAL ENCOUNTER: ICD-10-CM

## 2022-12-30 DIAGNOSIS — S40.011A CONTUSION OF MULTIPLE SITES OF RIGHT SHOULDER AND UPPER ARM, INITIAL ENCOUNTER: ICD-10-CM

## 2022-12-30 DIAGNOSIS — S42.494S: ICD-10-CM

## 2022-12-30 DIAGNOSIS — S40.021A CONTUSION OF MULTIPLE SITES OF RIGHT SHOULDER AND UPPER ARM, INITIAL ENCOUNTER: ICD-10-CM

## 2022-12-30 LAB
BASOPHILS ABSOLUTE: 0.09 E9/L (ref 0–0.2)
BASOPHILS RELATIVE PERCENT: 1.6 % (ref 0–2)
EOSINOPHILS ABSOLUTE: 0.13 E9/L (ref 0.05–0.5)
EOSINOPHILS RELATIVE PERCENT: 2.3 % (ref 0–6)
HCT VFR BLD CALC: 31.7 % (ref 34–48)
HEMOGLOBIN: 10.7 G/DL (ref 11.5–15.5)
IMMATURE GRANULOCYTES #: 0.03 E9/L
IMMATURE GRANULOCYTES %: 0.5 % (ref 0–5)
INR BLD: 1.7
LYMPHOCYTES ABSOLUTE: 1.71 E9/L (ref 1.5–4)
LYMPHOCYTES RELATIVE PERCENT: 30.9 % (ref 20–42)
MCH RBC QN AUTO: 32.8 PG (ref 26–35)
MCHC RBC AUTO-ENTMCNC: 33.8 % (ref 32–34.5)
MCV RBC AUTO: 97.2 FL (ref 80–99.9)
MONOCYTES ABSOLUTE: 0.64 E9/L (ref 0.1–0.95)
MONOCYTES RELATIVE PERCENT: 11.6 % (ref 2–12)
NEUTROPHILS ABSOLUTE: 2.94 E9/L (ref 1.8–7.3)
NEUTROPHILS RELATIVE PERCENT: 53.1 % (ref 43–80)
PDW BLD-RTO: 14 FL (ref 11.5–15)
PLATELET # BLD: 205 E9/L (ref 130–450)
PMV BLD AUTO: 10 FL (ref 7–12)
PROTHROMBIN TIME: 19 SEC (ref 9.3–12.4)
RBC # BLD: 3.26 E12/L (ref 3.5–5.5)
WBC # BLD: 5.5 E9/L (ref 4.5–11.5)

## 2022-12-30 PROCEDURE — 73080 X-RAY EXAM OF ELBOW: CPT

## 2022-12-30 PROCEDURE — 73060 X-RAY EXAM OF HUMERUS: CPT

## 2022-12-30 PROCEDURE — 72125 CT NECK SPINE W/O DYE: CPT

## 2022-12-30 PROCEDURE — 70450 CT HEAD/BRAIN W/O DYE: CPT

## 2022-12-30 PROCEDURE — 85025 COMPLETE CBC W/AUTO DIFF WBC: CPT

## 2022-12-30 PROCEDURE — 73200 CT UPPER EXTREMITY W/O DYE: CPT

## 2022-12-30 PROCEDURE — 99284 EMERGENCY DEPT VISIT MOD MDM: CPT

## 2022-12-30 PROCEDURE — 85610 PROTHROMBIN TIME: CPT

## 2022-12-30 PROCEDURE — 6370000000 HC RX 637 (ALT 250 FOR IP): Performed by: EMERGENCY MEDICINE

## 2022-12-30 PROCEDURE — 71250 CT THORAX DX C-: CPT

## 2022-12-30 PROCEDURE — 73030 X-RAY EXAM OF SHOULDER: CPT

## 2022-12-30 RX ORDER — OXYCODONE HYDROCHLORIDE AND ACETAMINOPHEN 5; 325 MG/1; MG/1
1 TABLET ORAL ONCE
Status: COMPLETED | OUTPATIENT
Start: 2022-12-30 | End: 2022-12-30

## 2022-12-30 RX ORDER — OXYCODONE HYDROCHLORIDE AND ACETAMINOPHEN 5; 325 MG/1; MG/1
1 TABLET ORAL EVERY 8 HOURS PRN
Qty: 6 TABLET | Refills: 0 | Status: SHIPPED | OUTPATIENT
Start: 2022-12-30 | End: 2023-01-01

## 2022-12-30 RX ADMIN — OXYCODONE AND ACETAMINOPHEN 1 TABLET: 5; 325 TABLET ORAL at 11:41

## 2022-12-30 ASSESSMENT — PAIN SCALES - GENERAL: PAINLEVEL_OUTOF10: 9

## 2022-12-30 NOTE — ED NOTES
Discharge instructions given and pt verbalized understanding. Gait steady. No distress noted.      Delia Ca RN  12/30/22 6892
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
## 2025-03-10 ENCOUNTER — APPOINTMENT (OUTPATIENT)
Dept: GENERAL RADIOLOGY | Age: 65
DRG: 536 | End: 2025-03-10
Payer: COMMERCIAL

## 2025-03-10 ENCOUNTER — HOSPITAL ENCOUNTER (INPATIENT)
Age: 65
LOS: 2 days | Discharge: HOME HEALTH | DRG: 536 | End: 2025-03-12
Attending: EMERGENCY MEDICINE | Admitting: STUDENT IN AN ORGANIZED HEALTH CARE EDUCATION/TRAINING PROGRAM
Payer: COMMERCIAL

## 2025-03-10 ENCOUNTER — APPOINTMENT (OUTPATIENT)
Dept: CT IMAGING | Age: 65
DRG: 536 | End: 2025-03-10
Payer: COMMERCIAL

## 2025-03-10 DIAGNOSIS — S72.90XA CLOSED FRACTURE OF FEMUR, UNSPECIFIED FRACTURE MORPHOLOGY, UNSPECIFIED LATERALITY, UNSPECIFIED PORTION OF FEMUR, INITIAL ENCOUNTER (HCC): ICD-10-CM

## 2025-03-10 DIAGNOSIS — S72.115A NONDISPLACED FRACTURE OF GREATER TROCHANTER OF LEFT FEMUR, INITIAL ENCOUNTER FOR CLOSED FRACTURE (HCC): Primary | ICD-10-CM

## 2025-03-10 PROBLEM — F12.90 MARIJUANA USE: Chronic | Status: ACTIVE | Noted: 2025-03-10

## 2025-03-10 PROBLEM — I48.91 ATRIAL FIBRILLATION (HCC): Chronic | Status: ACTIVE | Noted: 2025-03-10

## 2025-03-10 PROBLEM — S72.112A DISPLACED FRACTURE OF GREATER TROCHANTER OF LEFT FEMUR, INITIAL ENCOUNTER FOR CLOSED FRACTURE (HCC): Status: ACTIVE | Noted: 2025-03-10

## 2025-03-10 PROBLEM — R26.2 AMBULATORY DYSFUNCTION: Status: ACTIVE | Noted: 2025-03-10

## 2025-03-10 PROBLEM — G62.9 NEUROPATHY: Chronic | Status: ACTIVE | Noted: 2025-03-10

## 2025-03-10 LAB
25(OH)D3 SERPL-MCNC: 47.3 NG/ML (ref 30–100)
ANION GAP SERPL CALCULATED.3IONS-SCNC: 13 MMOL/L (ref 7–16)
BASOPHILS # BLD: 0.05 K/UL (ref 0–0.2)
BASOPHILS NFR BLD: 0 % (ref 0–2)
BUN SERPL-MCNC: 5 MG/DL (ref 6–23)
CALCIUM SERPL-MCNC: 9.3 MG/DL (ref 8.6–10.2)
CHLORIDE SERPL-SCNC: 96 MMOL/L (ref 98–107)
CO2 SERPL-SCNC: 26 MMOL/L (ref 22–29)
CREAT SERPL-MCNC: 0.7 MG/DL (ref 0.5–1)
EOSINOPHIL # BLD: 0.19 K/UL (ref 0.05–0.5)
EOSINOPHILS RELATIVE PERCENT: 1 % (ref 0–6)
ERYTHROCYTE [DISTWIDTH] IN BLOOD BY AUTOMATED COUNT: 13.3 % (ref 11.5–15)
GFR, ESTIMATED: 89 ML/MIN/1.73M2
GLUCOSE SERPL-MCNC: 93 MG/DL (ref 74–99)
HCT VFR BLD AUTO: 37.8 % (ref 34–48)
HGB BLD-MCNC: 12.8 G/DL (ref 11.5–15.5)
IMM GRANULOCYTES # BLD AUTO: 0.08 K/UL (ref 0–0.58)
IMM GRANULOCYTES NFR BLD: 1 % (ref 0–5)
LYMPHOCYTES NFR BLD: 1.69 K/UL (ref 1.5–4)
LYMPHOCYTES RELATIVE PERCENT: 13 % (ref 20–42)
MCH RBC QN AUTO: 33.3 PG (ref 26–35)
MCHC RBC AUTO-ENTMCNC: 33.9 G/DL (ref 32–34.5)
MCV RBC AUTO: 98.4 FL (ref 80–99.9)
MONOCYTES NFR BLD: 1.13 K/UL (ref 0.1–0.95)
MONOCYTES NFR BLD: 9 % (ref 2–12)
NEUTROPHILS NFR BLD: 76 % (ref 43–80)
NEUTS SEG NFR BLD: 10.08 K/UL (ref 1.8–7.3)
PLATELET # BLD AUTO: 215 K/UL (ref 130–450)
PMV BLD AUTO: 9.8 FL (ref 7–12)
POTASSIUM SERPL-SCNC: 4.5 MMOL/L (ref 3.5–5)
RBC # BLD AUTO: 3.84 M/UL (ref 3.5–5.5)
SODIUM SERPL-SCNC: 135 MMOL/L (ref 132–146)
WBC OTHER # BLD: 13.2 K/UL (ref 4.5–11.5)

## 2025-03-10 PROCEDURE — 96375 TX/PRO/DX INJ NEW DRUG ADDON: CPT

## 2025-03-10 PROCEDURE — 6370000000 HC RX 637 (ALT 250 FOR IP)

## 2025-03-10 PROCEDURE — 85025 COMPLETE CBC W/AUTO DIFF WBC: CPT

## 2025-03-10 PROCEDURE — 96374 THER/PROPH/DIAG INJ IV PUSH: CPT

## 2025-03-10 PROCEDURE — 1200000000 HC SEMI PRIVATE

## 2025-03-10 PROCEDURE — 73502 X-RAY EXAM HIP UNI 2-3 VIEWS: CPT

## 2025-03-10 PROCEDURE — 6370000000 HC RX 637 (ALT 250 FOR IP): Performed by: STUDENT IN AN ORGANIZED HEALTH CARE EDUCATION/TRAINING PROGRAM

## 2025-03-10 PROCEDURE — 72131 CT LUMBAR SPINE W/O DYE: CPT

## 2025-03-10 PROCEDURE — 96376 TX/PRO/DX INJ SAME DRUG ADON: CPT

## 2025-03-10 PROCEDURE — 99222 1ST HOSP IP/OBS MODERATE 55: CPT | Performed by: STUDENT IN AN ORGANIZED HEALTH CARE EDUCATION/TRAINING PROGRAM

## 2025-03-10 PROCEDURE — 99285 EMERGENCY DEPT VISIT HI MDM: CPT

## 2025-03-10 PROCEDURE — 80048 BASIC METABOLIC PNL TOTAL CA: CPT

## 2025-03-10 PROCEDURE — 2500000003 HC RX 250 WO HCPCS

## 2025-03-10 PROCEDURE — 6360000002 HC RX W HCPCS

## 2025-03-10 PROCEDURE — 73700 CT LOWER EXTREMITY W/O DYE: CPT

## 2025-03-10 PROCEDURE — 82306 VITAMIN D 25 HYDROXY: CPT

## 2025-03-10 RX ORDER — MORPHINE SULFATE 4 MG/ML
4 INJECTION, SOLUTION INTRAMUSCULAR; INTRAVENOUS ONCE
Refills: 0 | Status: COMPLETED | OUTPATIENT
Start: 2025-03-10 | End: 2025-03-10

## 2025-03-10 RX ORDER — SIMVASTATIN 80 MG
80 TABLET ORAL NIGHTLY
COMMUNITY

## 2025-03-10 RX ORDER — SODIUM CHLORIDE 0.9 % (FLUSH) 0.9 %
5-40 SYRINGE (ML) INJECTION EVERY 12 HOURS SCHEDULED
Status: DISCONTINUED | OUTPATIENT
Start: 2025-03-10 | End: 2025-03-12 | Stop reason: HOSPADM

## 2025-03-10 RX ORDER — ATORVASTATIN CALCIUM 40 MG/1
40 TABLET, FILM COATED ORAL DAILY
Status: DISCONTINUED | OUTPATIENT
Start: 2025-03-10 | End: 2025-03-12 | Stop reason: HOSPADM

## 2025-03-10 RX ORDER — ALBUTEROL SULFATE 0.83 MG/ML
2.5 SOLUTION RESPIRATORY (INHALATION) EVERY 6 HOURS PRN
Status: DISCONTINUED | OUTPATIENT
Start: 2025-03-10 | End: 2025-03-12 | Stop reason: HOSPADM

## 2025-03-10 RX ORDER — HYDROCODONE BITARTRATE AND ACETAMINOPHEN 5; 325 MG/1; MG/1
1 TABLET ORAL EVERY 6 HOURS PRN
Status: DISCONTINUED | OUTPATIENT
Start: 2025-03-10 | End: 2025-03-12 | Stop reason: HOSPADM

## 2025-03-10 RX ORDER — LOSARTAN POTASSIUM 50 MG/1
100 TABLET ORAL EVERY MORNING
Status: DISCONTINUED | OUTPATIENT
Start: 2025-03-11 | End: 2025-03-10

## 2025-03-10 RX ORDER — ERGOCALCIFEROL 1.25 MG/1
50000 CAPSULE, LIQUID FILLED ORAL WEEKLY
Status: DISCONTINUED | OUTPATIENT
Start: 2025-03-16 | End: 2025-03-12 | Stop reason: HOSPADM

## 2025-03-10 RX ORDER — ALBUTEROL SULFATE 0.83 MG/ML
2.5 SOLUTION RESPIRATORY (INHALATION) EVERY 6 HOURS PRN
COMMUNITY
Start: 2024-12-11

## 2025-03-10 RX ORDER — LOSARTAN POTASSIUM 100 MG/1
100 TABLET ORAL EVERY MORNING
COMMUNITY

## 2025-03-10 RX ORDER — ASPIRIN 81 MG/1
81 TABLET ORAL EVERY MORNING
Status: DISCONTINUED | OUTPATIENT
Start: 2025-03-11 | End: 2025-03-12 | Stop reason: HOSPADM

## 2025-03-10 RX ORDER — METOPROLOL SUCCINATE 100 MG/1
100 TABLET, EXTENDED RELEASE ORAL 2 TIMES DAILY
Status: DISCONTINUED | OUTPATIENT
Start: 2025-03-10 | End: 2025-03-12 | Stop reason: HOSPADM

## 2025-03-10 RX ORDER — VILAZODONE HYDROCHLORIDE 40 MG/1
40 TABLET ORAL EVERY MORNING
Status: DISCONTINUED | OUTPATIENT
Start: 2025-03-11 | End: 2025-03-12 | Stop reason: HOSPADM

## 2025-03-10 RX ORDER — PROPAFENONE HYDROCHLORIDE 225 MG/1
225 TABLET, FILM COATED ORAL 3 TIMES DAILY
COMMUNITY
Start: 2025-02-06

## 2025-03-10 RX ORDER — ALPRAZOLAM 0.25 MG
0.5 TABLET ORAL ONCE
Status: COMPLETED | OUTPATIENT
Start: 2025-03-10 | End: 2025-03-10

## 2025-03-10 RX ORDER — ACETAMINOPHEN 500 MG
1000 TABLET ORAL 2 TIMES DAILY PRN
COMMUNITY

## 2025-03-10 RX ORDER — SODIUM CHLORIDE 0.9 % (FLUSH) 0.9 %
5-40 SYRINGE (ML) INJECTION PRN
Status: DISCONTINUED | OUTPATIENT
Start: 2025-03-10 | End: 2025-03-12 | Stop reason: HOSPADM

## 2025-03-10 RX ORDER — POLYETHYLENE GLYCOL 3350 17 G/17G
17 POWDER, FOR SOLUTION ORAL DAILY PRN
Status: DISCONTINUED | OUTPATIENT
Start: 2025-03-10 | End: 2025-03-12 | Stop reason: HOSPADM

## 2025-03-10 RX ORDER — POTASSIUM CHLORIDE 7.45 MG/ML
10 INJECTION INTRAVENOUS PRN
Status: DISCONTINUED | OUTPATIENT
Start: 2025-03-10 | End: 2025-03-11

## 2025-03-10 RX ORDER — PROPAFENONE HYDROCHLORIDE 225 MG/1
225 TABLET, FILM COATED ORAL 3 TIMES DAILY
Status: DISCONTINUED | OUTPATIENT
Start: 2025-03-10 | End: 2025-03-12 | Stop reason: HOSPADM

## 2025-03-10 RX ORDER — POTASSIUM CHLORIDE 1500 MG/1
40 TABLET, EXTENDED RELEASE ORAL PRN
Status: DISCONTINUED | OUTPATIENT
Start: 2025-03-10 | End: 2025-03-11

## 2025-03-10 RX ORDER — METOPROLOL SUCCINATE 100 MG/1
100 TABLET, EXTENDED RELEASE ORAL 2 TIMES DAILY
COMMUNITY

## 2025-03-10 RX ORDER — KETOROLAC TROMETHAMINE 15 MG/ML
15 INJECTION, SOLUTION INTRAMUSCULAR; INTRAVENOUS EVERY 6 HOURS PRN
Status: DISCONTINUED | OUTPATIENT
Start: 2025-03-10 | End: 2025-03-12 | Stop reason: HOSPADM

## 2025-03-10 RX ORDER — ONDANSETRON 2 MG/ML
4 INJECTION INTRAMUSCULAR; INTRAVENOUS ONCE
Status: COMPLETED | OUTPATIENT
Start: 2025-03-10 | End: 2025-03-10

## 2025-03-10 RX ORDER — SODIUM CHLORIDE 9 MG/ML
INJECTION, SOLUTION INTRAVENOUS PRN
Status: DISCONTINUED | OUTPATIENT
Start: 2025-03-10 | End: 2025-03-12 | Stop reason: HOSPADM

## 2025-03-10 RX ORDER — OXYCODONE AND ACETAMINOPHEN 5; 325 MG/1; MG/1
1 TABLET ORAL ONCE
Refills: 0 | Status: COMPLETED | OUTPATIENT
Start: 2025-03-10 | End: 2025-03-10

## 2025-03-10 RX ORDER — ATOMOXETINE 60 MG/1
60 CAPSULE ORAL EVERY MORNING
Status: DISCONTINUED | OUTPATIENT
Start: 2025-03-11 | End: 2025-03-12 | Stop reason: HOSPADM

## 2025-03-10 RX ORDER — FOLIC ACID 1 MG/1
1 TABLET ORAL EVERY MORNING
Status: DISCONTINUED | OUTPATIENT
Start: 2025-03-11 | End: 2025-03-12 | Stop reason: HOSPADM

## 2025-03-10 RX ORDER — ALPRAZOLAM 0.25 MG
0.5 TABLET ORAL 3 TIMES DAILY PRN
Status: DISCONTINUED | OUTPATIENT
Start: 2025-03-10 | End: 2025-03-12 | Stop reason: HOSPADM

## 2025-03-10 RX ORDER — ATOMOXETINE 60 MG/1
60 CAPSULE ORAL EVERY MORNING
COMMUNITY
Start: 2025-02-27

## 2025-03-10 RX ORDER — METOPROLOL SUCCINATE 25 MG/1
100 TABLET, EXTENDED RELEASE ORAL 2 TIMES DAILY
Status: DISCONTINUED | OUTPATIENT
Start: 2025-03-10 | End: 2025-03-10

## 2025-03-10 RX ORDER — FLUTICASONE PROPIONATE 50 MCG
1 SPRAY, SUSPENSION (ML) NASAL DAILY PRN
COMMUNITY

## 2025-03-10 RX ORDER — LOSARTAN POTASSIUM 50 MG/1
100 TABLET ORAL EVERY MORNING
Status: DISCONTINUED | OUTPATIENT
Start: 2025-03-10 | End: 2025-03-12 | Stop reason: HOSPADM

## 2025-03-10 RX ORDER — PANTOPRAZOLE SODIUM 40 MG/1
40 TABLET, DELAYED RELEASE ORAL 2 TIMES DAILY
Status: DISCONTINUED | OUTPATIENT
Start: 2025-03-10 | End: 2025-03-12 | Stop reason: HOSPADM

## 2025-03-10 RX ORDER — ALPRAZOLAM 0.5 MG
0.5 TABLET ORAL 3 TIMES DAILY PRN
COMMUNITY

## 2025-03-10 RX ORDER — GABAPENTIN 600 MG/1
600 TABLET ORAL 3 TIMES DAILY
COMMUNITY
Start: 2025-03-05

## 2025-03-10 RX ORDER — ERGOCALCIFEROL 1.25 MG/1
50000 CAPSULE, LIQUID FILLED ORAL WEEKLY
COMMUNITY

## 2025-03-10 RX ORDER — GABAPENTIN 300 MG/1
600 CAPSULE ORAL 3 TIMES DAILY
Status: DISCONTINUED | OUTPATIENT
Start: 2025-03-10 | End: 2025-03-12 | Stop reason: HOSPADM

## 2025-03-10 RX ORDER — FLUTICASONE PROPIONATE 50 MCG
1 SPRAY, SUSPENSION (ML) NASAL DAILY PRN
Status: DISCONTINUED | OUTPATIENT
Start: 2025-03-10 | End: 2025-03-12 | Stop reason: HOSPADM

## 2025-03-10 RX ORDER — ACETAMINOPHEN 325 MG/1
650 TABLET ORAL EVERY 6 HOURS PRN
Status: DISCONTINUED | OUTPATIENT
Start: 2025-03-10 | End: 2025-03-12 | Stop reason: HOSPADM

## 2025-03-10 RX ORDER — HYDROCODONE BITARTRATE AND ACETAMINOPHEN 5; 325 MG/1; MG/1
1 TABLET ORAL ONCE
Status: COMPLETED | OUTPATIENT
Start: 2025-03-10 | End: 2025-03-10

## 2025-03-10 RX ORDER — VILAZODONE HYDROCHLORIDE 40 MG/1
40 TABLET ORAL EVERY MORNING
COMMUNITY
Start: 2025-02-27

## 2025-03-10 RX ORDER — METHOCARBAMOL 750 MG/1
750 TABLET, FILM COATED ORAL EVERY 6 HOURS PRN
Status: DISCONTINUED | OUTPATIENT
Start: 2025-03-10 | End: 2025-03-10

## 2025-03-10 RX ORDER — ACETAMINOPHEN 650 MG/1
650 SUPPOSITORY RECTAL EVERY 6 HOURS PRN
Status: DISCONTINUED | OUTPATIENT
Start: 2025-03-10 | End: 2025-03-12 | Stop reason: HOSPADM

## 2025-03-10 RX ORDER — APIXABAN 5 MG/1
5 TABLET, FILM COATED ORAL 2 TIMES DAILY
COMMUNITY
Start: 2025-03-05

## 2025-03-10 RX ORDER — ONDANSETRON 2 MG/ML
4 INJECTION INTRAMUSCULAR; INTRAVENOUS EVERY 6 HOURS PRN
Status: DISCONTINUED | OUTPATIENT
Start: 2025-03-10 | End: 2025-03-12 | Stop reason: HOSPADM

## 2025-03-10 RX ORDER — HYDRALAZINE HYDROCHLORIDE 20 MG/ML
10 INJECTION INTRAMUSCULAR; INTRAVENOUS EVERY 4 HOURS PRN
Status: DISCONTINUED | OUTPATIENT
Start: 2025-03-10 | End: 2025-03-12 | Stop reason: HOSPADM

## 2025-03-10 RX ORDER — PANTOPRAZOLE SODIUM 40 MG/1
40 TABLET, DELAYED RELEASE ORAL 2 TIMES DAILY
COMMUNITY

## 2025-03-10 RX ORDER — MAGNESIUM SULFATE IN WATER 40 MG/ML
2000 INJECTION, SOLUTION INTRAVENOUS PRN
Status: DISCONTINUED | OUTPATIENT
Start: 2025-03-10 | End: 2025-03-11

## 2025-03-10 RX ORDER — ONDANSETRON 4 MG/1
4 TABLET, ORALLY DISINTEGRATING ORAL EVERY 8 HOURS PRN
Status: DISCONTINUED | OUTPATIENT
Start: 2025-03-10 | End: 2025-03-12 | Stop reason: HOSPADM

## 2025-03-10 RX ADMIN — HYDROCODONE BITARTRATE AND ACETAMINOPHEN 1 TABLET: 5; 325 TABLET ORAL at 06:35

## 2025-03-10 RX ADMIN — GABAPENTIN 600 MG: 300 CAPSULE ORAL at 17:59

## 2025-03-10 RX ADMIN — MORPHINE SULFATE 4 MG: 4 INJECTION, SOLUTION INTRAMUSCULAR; INTRAVENOUS at 08:27

## 2025-03-10 RX ADMIN — METOPROLOL SUCCINATE 100 MG: 100 TABLET, EXTENDED RELEASE ORAL at 17:59

## 2025-03-10 RX ADMIN — MORPHINE SULFATE 4 MG: 4 INJECTION, SOLUTION INTRAMUSCULAR; INTRAVENOUS at 14:14

## 2025-03-10 RX ADMIN — GABAPENTIN 600 MG: 300 CAPSULE ORAL at 19:50

## 2025-03-10 RX ADMIN — APIXABAN 5 MG: 5 TABLET, FILM COATED ORAL at 19:51

## 2025-03-10 RX ADMIN — LOSARTAN POTASSIUM 100 MG: 50 TABLET, FILM COATED ORAL at 18:00

## 2025-03-10 RX ADMIN — ALPRAZOLAM 0.5 MG: 0.25 TABLET ORAL at 19:51

## 2025-03-10 RX ADMIN — OXYCODONE HYDROCHLORIDE AND ACETAMINOPHEN 1 TABLET: 5; 325 TABLET ORAL at 10:59

## 2025-03-10 RX ADMIN — Medication 9 MG: at 19:51

## 2025-03-10 RX ADMIN — PROPAFENONE HYDROCHLORIDE 225 MG: 225 TABLET, FILM COATED ORAL at 20:10

## 2025-03-10 RX ADMIN — PANTOPRAZOLE SODIUM 40 MG: 40 TABLET, DELAYED RELEASE ORAL at 19:50

## 2025-03-10 RX ADMIN — ONDANSETRON 4 MG: 2 INJECTION, SOLUTION INTRAMUSCULAR; INTRAVENOUS at 12:24

## 2025-03-10 RX ADMIN — SODIUM CHLORIDE, PRESERVATIVE FREE 10 ML: 5 INJECTION INTRAVENOUS at 19:52

## 2025-03-10 RX ADMIN — ALPRAZOLAM 0.5 MG: 0.25 TABLET ORAL at 14:15

## 2025-03-10 RX ADMIN — HYDROCODONE BITARTRATE AND ACETAMINOPHEN 1 TABLET: 5; 325 TABLET ORAL at 19:51

## 2025-03-10 ASSESSMENT — LIFESTYLE VARIABLES
HOW MANY STANDARD DRINKS CONTAINING ALCOHOL DO YOU HAVE ON A TYPICAL DAY: PATIENT DOES NOT DRINK
HOW OFTEN DO YOU HAVE A DRINK CONTAINING ALCOHOL: NEVER

## 2025-03-10 ASSESSMENT — PAIN SCALES - GENERAL
PAINLEVEL_OUTOF10: 10
PAINLEVEL_OUTOF10: 9
PAINLEVEL_OUTOF10: 10
PAINLEVEL_OUTOF10: 9
PAINLEVEL_OUTOF10: 10

## 2025-03-10 ASSESSMENT — PAIN DESCRIPTION - LOCATION
LOCATION: HIP

## 2025-03-10 ASSESSMENT — PAIN DESCRIPTION - ORIENTATION
ORIENTATION: LEFT

## 2025-03-10 ASSESSMENT — PAIN DESCRIPTION - DESCRIPTORS
DESCRIPTORS: THROBBING
DESCRIPTORS: ACHING;DISCOMFORT;SORE
DESCRIPTORS: THROBBING
DESCRIPTORS: THROBBING

## 2025-03-10 NOTE — PROGRESS NOTES
Database initiated. Patient is A&O comes in from home with . States she uses no assistive devices and is RA at baseline. She has fallen a lot recently and is having diarrhea.

## 2025-03-10 NOTE — ED NOTES
ED to Inpatient Handoff Report    Notified rebeca that electronic handoff available and patient ready for transport to room 706.    Safety Risks: Difficulty with daily activities and Risk of falls    Patient in Restraints: no    Constant Observer or Patient : no    Telemetry Monitoring Ordered :No           Order to transfer to unit without monitor:NO    Last MEWS: 1 Time completed: 1    Deterioration Index Score:   Predictive Model Details          26 (Normal)  Factor Value    Calculated 3/10/2025 18:01 43% Age 64 years old    Deterioration Index Model 22% Systolic 180     11% WBC count abnormal (13.2 k/uL)     11% Potassium 4.5 mmol/L     7% Pulse oximetry 92 %     4% BUN abnormal (5 mg/dL)     3% Sodium 135 mmol/L     1% Pulse 82     0% Temperature 98.6 °F (37 °C)     0% Respiratory rate 16     0% Hematocrit 37.8 %        Vitals:    03/10/25 0555 03/10/25 1455 03/10/25 1749 03/10/25 1759   BP: (!) 127/91 (!) 198/86 (!) 180/90    Pulse: 73 74 77 82   Resp: 18 15 16    Temp: 98.3 °F (36.8 °C)  98.6 °F (37 °C)    TempSrc: Oral  Oral    SpO2: 99% 91% 92%    Weight: 73.5 kg (162 lb)      Height: 1.626 m (5' 4\")            Opportunity for questions and clarification was provided.

## 2025-03-10 NOTE — CONSULTS
Ortho consult      Reason for consult left hip fx  Requested by Dr. Baca  Pain score 8/10  CHIEF COMPLAINT:  left hip pain     History of Present Illness:  64 y.o. female with a history of atrial fibrillation on eliquis, essential HTN, ETOH abuse, anxiety/depression presents with left hip pain. Patient denies falls but states she did hit hip Saturday evening while ambulating down the states. States she has difficulty ambulating, especially down stairs due to neuropathy. Was unable to ambulate upon awaking this AM. States pain in left hip is 10/10, intermittent, and worsened with movement. Somewhat alleviated with rest. Denies any bowel/bladder dysfunction. CT lumbar spine showed sclerosis and healing superior endplate fracture of T11, degenerative changes. CT L femur showed mildly displaced fracture of left femur greater trochanter, small soft tissue contusion. Bilateral hip XR showed degerative changes bilaterally. Pertinent abnormal labs: chloride 96, BUN 5, WBC 13.2. ED course included xanax 0.5mg, norco 1 tab, morphine 4mg X2, zofran 4mg, percocet 1 tab.     Left hip pain.  Pain is sharp.  Pain is the same.  It is worse with activities.     Informant(s) for H&P:Patient and EMR     REVIEW OF SYSTEMS:  Nausea, diarrhea. no fevers, chills, cp, sob, v, ha, vision/hearing changes, wt changes, hot/cold flashes, other open skin lesions, constipation, dysuria/hematuria unless noted in HPI. Complete ROS performed with the patient and is otherwise negative.        PMH:  Past Medical History        Past Medical History:   Diagnosis Date    Alcohol abuse, unspecified 05/05/2015    Allergic rhinitis, cause unspecified 05/19/2015    Anxiety disorder in conditions classified elsewhere 08/08/2011    Anxiety state, unspecified 09/05/2013    Atrial fibrillation (HCC) 10/20/2011    Chronic airway obstruction, not elsewhere classified 08/08/2011    Chronic interstitial cystitis 12/12/2012    Degeneration of lumbar or lumbosacral

## 2025-03-10 NOTE — ED PROVIDER NOTES
[facility] EMERGENCY DEPARTMENT  EMERGENCY DEPARTMENT ENCOUNTER        Pt Name: Nury Fraser  MRN: [id number]  Birthdate 1960  Date of evaluation: 3/10/2025  Provider: Red Mello DO  PCP: Afsaneh Barker MD  Note Started: 6:31 AM EDT 3/10/25    CHIEF COMPLAINT       Chief Complaint   Patient presents with    Hip Pain     Left hip pain no fall or injury. Woke up with the pain        HISTORY OF PRESENT ILLNESS: 1 or more Elements   History From: patient    Limitations to history : None    Nury Fraser is a 64 y.o. female who presents to emergency department for left thigh pain that started this morning when she woke up.  Patient localizes to the lateral left thigh and some lower back pain.  She has been trying to take care of her brother recently who is a paraplegic as well as her father.  Denies fall or trauma.  Does take Eliquis for atrial fibrillation. Patient denies fever, chills, headache, shortness of breath, chest pain, abdominal pain, nausea, vomiting, diarrhea, dysuria, hematuria, hematochezia, and melena.    Nursing Notes were all reviewed and agreed with or any disagreements were addressed in the HPI.      REVIEW OF SYSTEMS :           Positives and Pertinent negatives as per HPI.     SURGICAL HISTORY     Past Surgical History:   Procedure Laterality Date    APPENDECTOMY      BREAST SURGERY      CARDIAC CATHETERIZATION      years ago patient stated had some blockages physician could not treat     SECTION      CHOLECYSTECTOMY      KNEE ARTHROSCOPY      NECK SURGERY      metal plate    OTHER SURGICAL HISTORY  2020    tilt table       CURRENTMEDICATIONS       Previous Medications    ACETAMINOPHEN (TYLENOL) 500 MG TABLET    Take 1 tablet by mouth 3 times daily as needed for Pain Do not exceed 4000 mg of acetaminophen/day    ADVAIR DISKUS 250-50 MCG/DOSE AEPB    inhale 1 dose by mouth twice a day    ALBUTEROL SULFATE HFA (VENTOLIN HFA) 108 (90

## 2025-03-10 NOTE — H&P
[facility] Hospitalist Group   History and Physical      CHIEF COMPLAINT:  left hip pain    History of Present Illness:  64 y.o. female with a history of atrial fibrillation on eliquis, essential HTN, ETOH abuse, anxiety/depression presents with left hip pain. Patient denies falls but states she did hit hip Saturday evening while ambulating down the states. States she has difficulty ambulating, especially down stairs due to neuropathy. Was unable to ambulate upon awaking this AM. States pain in left hip is 10/10, intermittent, and worsened with movement. Somewhat alleviated with rest. Denies any bowel/bladder dysfunction. CT lumbar spine showed sclerosis and healing superior endplate fracture of T11, degenerative changes. CT L femur showed mildly displaced fracture of left femur greater trochanter, small soft tissue contusion. Bilateral hip XR showed degerative changes bilaterally. Pertinent abnormal labs: chloride 96, BUN 5, WBC 13.2. ED course included xanax 0.5mg, norco 1 tab, morphine 4mg X2, zofran 4mg, percocet 1 tab.     Informant(s) for H&P:Patient and EMR    REVIEW OF SYSTEMS:  Nausea, diarrhea. no fevers, chills, cp, sob, v, ha, vision/hearing changes, wt changes, hot/cold flashes, other open skin lesions, constipation, dysuria/hematuria unless noted in HPI. Complete ROS performed with the patient and is otherwise negative.      PMH:  Past Medical History:   Diagnosis Date    Alcohol abuse, unspecified 05/05/2015    Allergic rhinitis, cause unspecified 05/19/2015    Anxiety disorder in conditions classified elsewhere 08/08/2011    Anxiety state, unspecified 09/05/2013    Atrial fibrillation (HCC) 10/20/2011    Chronic airway obstruction, not elsewhere classified 08/08/2011    Chronic interstitial cystitis 12/12/2012    Degeneration of lumbar or lumbosacral intervertebral disc 08/08/2011    Depressive disorder, not elsewhere classified 12/09/2014    Essential hypertension, benign

## 2025-03-10 NOTE — PROGRESS NOTES
Will see later today  Left greater trochanter fracture  PWB left leg 50%  Assistive device  Closed treatment of fracture  No plan for surgery at this time

## 2025-03-11 PROBLEM — S72.90XA CLOSED FRACTURE OF FEMUR (HCC): Status: ACTIVE | Noted: 2025-03-11

## 2025-03-11 PROBLEM — E83.42 HYPOMAGNESEMIA: Status: ACTIVE | Noted: 2025-03-11

## 2025-03-11 LAB
ANION GAP SERPL CALCULATED.3IONS-SCNC: 15 MMOL/L (ref 7–16)
BASOPHILS # BLD: 0.09 K/UL (ref 0–0.2)
BASOPHILS NFR BLD: 1 % (ref 0–2)
BUN SERPL-MCNC: 4 MG/DL (ref 6–23)
CALCIUM SERPL-MCNC: 9.4 MG/DL (ref 8.6–10.2)
CHLORIDE SERPL-SCNC: 93 MMOL/L (ref 98–107)
CO2 SERPL-SCNC: 24 MMOL/L (ref 22–29)
CREAT SERPL-MCNC: 0.5 MG/DL (ref 0.5–1)
EOSINOPHIL # BLD: 0.29 K/UL (ref 0.05–0.5)
EOSINOPHILS RELATIVE PERCENT: 3 % (ref 0–6)
ERYTHROCYTE [DISTWIDTH] IN BLOOD BY AUTOMATED COUNT: 13.2 % (ref 11.5–15)
GFR, ESTIMATED: >90 ML/MIN/1.73M2
GLUCOSE SERPL-MCNC: 96 MG/DL (ref 74–99)
HCT VFR BLD AUTO: 39.2 % (ref 34–48)
HGB BLD-MCNC: 12.9 G/DL (ref 11.5–15.5)
IMM GRANULOCYTES # BLD AUTO: 0.03 K/UL (ref 0–0.58)
IMM GRANULOCYTES NFR BLD: 0 % (ref 0–5)
LYMPHOCYTES NFR BLD: 1.21 K/UL (ref 1.5–4)
LYMPHOCYTES RELATIVE PERCENT: 13 % (ref 20–42)
MAGNESIUM SERPL-MCNC: 1.4 MG/DL (ref 1.6–2.6)
MCH RBC QN AUTO: 32.7 PG (ref 26–35)
MCHC RBC AUTO-ENTMCNC: 32.9 G/DL (ref 32–34.5)
MCV RBC AUTO: 99.2 FL (ref 80–99.9)
MONOCYTES NFR BLD: 0.84 K/UL (ref 0.1–0.95)
MONOCYTES NFR BLD: 9 % (ref 2–12)
NEUTROPHILS NFR BLD: 74 % (ref 43–80)
NEUTS SEG NFR BLD: 7.11 K/UL (ref 1.8–7.3)
PLATELET # BLD AUTO: 183 K/UL (ref 130–450)
PMV BLD AUTO: 10.6 FL (ref 7–12)
POTASSIUM SERPL-SCNC: 3.5 MMOL/L (ref 3.5–5)
RBC # BLD AUTO: 3.95 M/UL (ref 3.5–5.5)
SODIUM SERPL-SCNC: 132 MMOL/L (ref 132–146)
WBC OTHER # BLD: 9.6 K/UL (ref 4.5–11.5)

## 2025-03-11 PROCEDURE — 83735 ASSAY OF MAGNESIUM: CPT

## 2025-03-11 PROCEDURE — 97530 THERAPEUTIC ACTIVITIES: CPT

## 2025-03-11 PROCEDURE — 6370000000 HC RX 637 (ALT 250 FOR IP): Performed by: STUDENT IN AN ORGANIZED HEALTH CARE EDUCATION/TRAINING PROGRAM

## 2025-03-11 PROCEDURE — 97161 PT EVAL LOW COMPLEX 20 MIN: CPT

## 2025-03-11 PROCEDURE — 6360000002 HC RX W HCPCS: Performed by: INTERNAL MEDICINE

## 2025-03-11 PROCEDURE — 1200000000 HC SEMI PRIVATE

## 2025-03-11 PROCEDURE — 80048 BASIC METABOLIC PNL TOTAL CA: CPT

## 2025-03-11 PROCEDURE — 6360000002 HC RX W HCPCS: Performed by: NURSE PRACTITIONER

## 2025-03-11 PROCEDURE — 99232 SBSQ HOSP IP/OBS MODERATE 35: CPT | Performed by: INTERNAL MEDICINE

## 2025-03-11 PROCEDURE — 6370000000 HC RX 637 (ALT 250 FOR IP)

## 2025-03-11 PROCEDURE — 2500000003 HC RX 250 WO HCPCS

## 2025-03-11 PROCEDURE — 97165 OT EVAL LOW COMPLEX 30 MIN: CPT

## 2025-03-11 PROCEDURE — 85025 COMPLETE CBC W/AUTO DIFF WBC: CPT

## 2025-03-11 PROCEDURE — 6360000002 HC RX W HCPCS

## 2025-03-11 RX ORDER — MAGNESIUM SULFATE IN WATER 40 MG/ML
2000 INJECTION, SOLUTION INTRAVENOUS ONCE
Status: COMPLETED | OUTPATIENT
Start: 2025-03-11 | End: 2025-03-11

## 2025-03-11 RX ORDER — MORPHINE SULFATE 2 MG/ML
2 INJECTION, SOLUTION INTRAMUSCULAR; INTRAVENOUS EVERY 4 HOURS PRN
Refills: 0 | Status: DISCONTINUED | OUTPATIENT
Start: 2025-03-11 | End: 2025-03-12 | Stop reason: HOSPADM

## 2025-03-11 RX ADMIN — VILAZODONE HYDROCHLORIDE 40 MG: 40 TABLET ORAL at 07:59

## 2025-03-11 RX ADMIN — ONDANSETRON 4 MG: 4 TABLET, ORALLY DISINTEGRATING ORAL at 09:14

## 2025-03-11 RX ADMIN — GABAPENTIN 600 MG: 300 CAPSULE ORAL at 21:42

## 2025-03-11 RX ADMIN — ATOMOXETINE 60 MG: 60 CAPSULE ORAL at 10:14

## 2025-03-11 RX ADMIN — GABAPENTIN 600 MG: 300 CAPSULE ORAL at 13:20

## 2025-03-11 RX ADMIN — PANTOPRAZOLE SODIUM 40 MG: 40 TABLET, DELAYED RELEASE ORAL at 07:57

## 2025-03-11 RX ADMIN — APIXABAN 5 MG: 5 TABLET, FILM COATED ORAL at 21:42

## 2025-03-11 RX ADMIN — ATORVASTATIN CALCIUM 40 MG: 40 TABLET, FILM COATED ORAL at 07:59

## 2025-03-11 RX ADMIN — MORPHINE SULFATE 2 MG: 2 INJECTION, SOLUTION INTRAMUSCULAR; INTRAVENOUS at 17:39

## 2025-03-11 RX ADMIN — GABAPENTIN 600 MG: 300 CAPSULE ORAL at 07:57

## 2025-03-11 RX ADMIN — METOPROLOL SUCCINATE 100 MG: 100 TABLET, EXTENDED RELEASE ORAL at 07:59

## 2025-03-11 RX ADMIN — HYDROCODONE BITARTRATE AND ACETAMINOPHEN 1 TABLET: 5; 325 TABLET ORAL at 09:14

## 2025-03-11 RX ADMIN — HYDROCODONE BITARTRATE AND ACETAMINOPHEN 1 TABLET: 5; 325 TABLET ORAL at 22:42

## 2025-03-11 RX ADMIN — APIXABAN 5 MG: 5 TABLET, FILM COATED ORAL at 07:59

## 2025-03-11 RX ADMIN — ALPRAZOLAM 0.5 MG: 0.25 TABLET ORAL at 13:21

## 2025-03-11 RX ADMIN — PANTOPRAZOLE SODIUM 40 MG: 40 TABLET, DELAYED RELEASE ORAL at 21:42

## 2025-03-11 RX ADMIN — KETOROLAC TROMETHAMINE 15 MG: 15 INJECTION, SOLUTION INTRAMUSCULAR; INTRAVENOUS at 05:44

## 2025-03-11 RX ADMIN — Medication 9 MG: at 21:42

## 2025-03-11 RX ADMIN — LOSARTAN POTASSIUM 100 MG: 50 TABLET, FILM COATED ORAL at 07:57

## 2025-03-11 RX ADMIN — MORPHINE SULFATE 2 MG: 2 INJECTION, SOLUTION INTRAMUSCULAR; INTRAVENOUS at 06:13

## 2025-03-11 RX ADMIN — ASPIRIN 81 MG: 81 TABLET, COATED ORAL at 07:57

## 2025-03-11 RX ADMIN — PROPAFENONE HYDROCHLORIDE 225 MG: 225 TABLET, FILM COATED ORAL at 07:58

## 2025-03-11 RX ADMIN — PROPAFENONE HYDROCHLORIDE 225 MG: 225 TABLET, FILM COATED ORAL at 21:46

## 2025-03-11 RX ADMIN — MORPHINE SULFATE 2 MG: 2 INJECTION, SOLUTION INTRAMUSCULAR; INTRAVENOUS at 10:46

## 2025-03-11 RX ADMIN — FOLIC ACID 1 MG: 1 TABLET ORAL at 07:57

## 2025-03-11 RX ADMIN — HYDROCODONE BITARTRATE AND ACETAMINOPHEN 1 TABLET: 5; 325 TABLET ORAL at 16:22

## 2025-03-11 RX ADMIN — METOPROLOL SUCCINATE 100 MG: 100 TABLET, EXTENDED RELEASE ORAL at 21:42

## 2025-03-11 RX ADMIN — PROPAFENONE HYDROCHLORIDE 225 MG: 225 TABLET, FILM COATED ORAL at 13:19

## 2025-03-11 RX ADMIN — MAGNESIUM SULFATE HEPTAHYDRATE 2000 MG: 40 INJECTION, SOLUTION INTRAVENOUS at 16:58

## 2025-03-11 RX ADMIN — MORPHINE SULFATE 2 MG: 2 INJECTION, SOLUTION INTRAMUSCULAR; INTRAVENOUS at 21:48

## 2025-03-11 RX ADMIN — ALPRAZOLAM 0.5 MG: 0.25 TABLET ORAL at 21:42

## 2025-03-11 RX ADMIN — SODIUM CHLORIDE, PRESERVATIVE FREE 10 ML: 5 INJECTION INTRAVENOUS at 08:00

## 2025-03-11 RX ADMIN — HYDROCODONE BITARTRATE AND ACETAMINOPHEN 1 TABLET: 5; 325 TABLET ORAL at 02:07

## 2025-03-11 ASSESSMENT — PAIN SCALES - GENERAL
PAINLEVEL_OUTOF10: 10
PAINLEVEL_OUTOF10: 9
PAINLEVEL_OUTOF10: 9
PAINLEVEL_OUTOF10: 10
PAINLEVEL_OUTOF10: 9
PAINLEVEL_OUTOF10: 0
PAINLEVEL_OUTOF10: 8
PAINLEVEL_OUTOF10: 9
PAINLEVEL_OUTOF10: 8

## 2025-03-11 ASSESSMENT — PAIN DESCRIPTION - DESCRIPTORS
DESCRIPTORS: ACHING
DESCRIPTORS: ACHING;DISCOMFORT;SORE
DESCRIPTORS: ACHING;SORE
DESCRIPTORS: ACHING;DISCOMFORT;SHARP;SORE
DESCRIPTORS: ACHING;SORE
DESCRIPTORS: ACHING;DISCOMFORT;SORE
DESCRIPTORS: ACHING;SORE
DESCRIPTORS: SORE

## 2025-03-11 ASSESSMENT — PAIN DESCRIPTION - LOCATION
LOCATION: HIP
LOCATION: HIP
LOCATION: HIP;LEG;BACK
LOCATION: HIP;LEG;BACK
LOCATION: LEG
LOCATION: HIP
LOCATION: HIP;BACK;LEG
LOCATION: HIP

## 2025-03-11 ASSESSMENT — PAIN DESCRIPTION - ORIENTATION
ORIENTATION: LEFT
ORIENTATION: RIGHT
ORIENTATION: LEFT
ORIENTATION: LEFT

## 2025-03-11 NOTE — ACP (ADVANCE CARE PLANNING)
3/11/2025  Advance Care Planning   Healthcare Decision Maker:  Horacio [phone]    Click here to complete Healthcare Decision Makers including selection of the Healthcare Decision Maker Relationship (ie \"Primary\").

## 2025-03-11 NOTE — PLAN OF CARE
Problem: ABCDS Injury Assessment  Goal: Absence of physical injury  Outcome: Progressing     Problem: Discharge Planning  Goal: Discharge to home or other facility with appropriate resources  Outcome: Progressing     Problem: Safety - Adult  Goal: Free from fall injury  Outcome: Progressing     Problem: Skin/Tissue Integrity  Goal: Skin integrity remains intact  Description: 1.  Monitor for areas of redness and/or skin breakdown  2.  Assess vascular access sites hourly  3.  Every 4-6 hours minimum:  Change oxygen saturation probe site  4.  Every 4-6 hours:  If on nasal continuous positive airway pressure, respiratory therapy assess nares and determine need for appliance change or resting period  Outcome: Progressing

## 2025-03-11 NOTE — CARE COORDINATION
3/11/2025  Social Work Discharge Planning: Displaced fx of left femur.This worker met with Pt to discuss  role and transition of care/discharge planning.Pt is independent from home with her spouse.  Pt is on room air and uses no DME but requesting a BSC and ww for home. Referral was made to [facility]. Waiting therapy evals;however, Pt is adamant about returning home and declining HHC. Electronically signed by WILI Ricketts on 3/11/2025 at 1:55 PM'

## 2025-03-11 NOTE — PROGRESS NOTES
Occupational Therapy    OCCUPATIONAL THERAPY INITIAL EVALUATION    [facility]   [address], [place]         Date:3/11/2025                                                  Patient Name: Nury Fraser    MRN: [id number]    : 1960    Room: [address] [place] [address]      Evaluating OT: Ivet Prater OTR/L   [id number]      Referring Provider:Marion Agee APRN - CNP     Specific Provider Orders/Date:OT eval and treat       Diagnosis:  Nondisplaced fracture of greater trochanter of left femur, initial encounter for closed fracture (HCC) [S72.115A]  Displaced fracture of greater trochanter of left femur, initial encounter for closed fracture (HCC) [S72.112A]    Per ortho note:     ASSESSMENT:    left greater trochanter fx  PLAN:   PWB 50% and walker for closed treatment of left greater troch fx  Pain control  No plan for surgery at this time    Pertinent Medical History: alcohol abuse, A fib, anxiety, IBS, ankle surgery     Precautions:  Fall Risk, PWB L LE      Assessment of current deficits    [x] Functional mobility  []ADLs  [x] Strength               []Cognition    [x] Functional transfers   [x] IADLs         [x] Safety Awareness   [x]Endurance    [] Fine Coordination              [x] Balance      [] Vision/perception   []Sensation     []Gross Motor Coordination  [] ROM  [] Delirium                   [] Motor Control     OT PLAN OF CARE   OT POC based on physician orders, patient diagnosis and results of clinical assessment    Frequency/Duration  2-4 days/wk for 2 - 4weeks PRN   Specific OT Treatment Interventions to include:   ADL retraining/adapted techniques and AE recommendations to increase functional independence within precautions                    Energy conservation techniques to improve tolerance for selfcare routine   Functional transfer/mobility training/DME recommendations for increased independence, safety and fall prevention

## 2025-03-11 NOTE — PROGRESS NOTES
[facility] Hospitalist   Progress Note    Admitting Date and Time: 3/10/2025  5:54 AM  Admit Dx: Nondisplaced fracture of greater trochanter of left femur, initial encounter for closed fracture (HCC) [S72.115A]  Displaced fracture of greater trochanter of left femur, initial encounter for closed fracture (HCC) [S72.112A]    Subjective:    Patient was admitted with Nondisplaced fracture of greater trochanter of left femur, initial encounter for closed fracture (HCC) [S72.115A]  Displaced fracture of greater trochanter of left femur, initial encounter for closed fracture (HCC) [S72.112A]. Patient denies fever, chills, cp, sob, n/v.     magnesium sulfate  2,000 mg IntraVENous Once    sodium chloride flush  5-40 mL IntraVENous 2 times per day    aspirin  81 mg Oral QAM    atomoxetine  60 mg Oral QAM    apixaban  5 mg Oral BID    folic acid  1 mg Oral QAM    gabapentin  600 mg Oral TID    melatonin  9 mg Oral Nightly    pantoprazole  40 mg Oral BID    propafenone  225 mg Oral TID    atorvastatin  40 mg Oral Daily    vilazodone HCl  40 mg Oral QAM    [START ON 3/16/2025] vitamin D  50,000 Units Oral Weekly    losartan  100 mg Oral QAM    metoprolol succinate  100 mg Oral BID     morphine, 2 mg, Q4H PRN  sodium chloride flush, 5-40 mL, PRN  sodium chloride, , PRN  ondansetron, 4 mg, Q8H PRN   Or  ondansetron, 4 mg, Q6H PRN  polyethylene glycol, 17 g, Daily PRN  acetaminophen, 650 mg, Q6H PRN   Or  acetaminophen, 650 mg, Q6H PRN  ketorolac, 15 mg, Q6H PRN  HYDROcodone 5 mg - acetaminophen, 1 tablet, Q6H PRN  ALPRAZolam, 0.5 mg, TID PRN  albuterol, 2.5 mg, Q6H PRN  fluticasone, 1 spray, Daily PRN  hydrALAZINE, 10 mg, Q4H PRN         Objective:    BP (!) 141/77   Pulse 67   Temp 98.2 °F (36.8 °C) (Oral)   Resp 18   Ht 1.626 m (5' 4\")   Wt 73.7 kg (162 lb 6.4 oz)   SpO2 93%   BMI 27.88 kg/m²   Skin: warm and dry, no rash or erythema  Pulmonary/Chest: clear to auscultation bilaterally- no wheezes, rales

## 2025-03-11 NOTE — PROGRESS NOTES
This RN was giving report to pt's day shift nurse. Pt accused this nurse of telling her oncoming nurse that she was \"high when she got to the floor.\" This RN never said that during report. This RN told pt's dayshift nurse that pt's BP was very high when she was admitted and pt has PRN hydralazine ordered.

## 2025-03-11 NOTE — PROGRESS NOTES
This RN was helping a pt in another room. I had to walk by this pt's room to get something from the clean utility room. This pt yelled out \"I need some help in here!\" This RN responded and said I would be right in. This RN asked the aide for help in this pt's room as soon as I was finished in the other pt's room. This RN grabbed pain medication for this pt and upon entering pt's room, pt was agitated and began yelling at this RN that she was ignored for 4 hours. This RN tried to explain that it was only 10 mins since I had walked by. Pt became increasingly agitated and yelled at this RN to \"get the fuck out of her room.\" This RN promptly left and had another nurse administer the medications and help the aide clean her up.

## 2025-03-11 NOTE — PROGRESS NOTES
4 Eyes Skin Assessment     NAME:  Nury Fraser  YOB: 1960  MEDICAL RECORD NUMBER:  [id number]    The patient is being assessed for  Admission    I agree that at least one RN has performed a thorough Head to Toe Skin Assessment on the patient. ALL assessment sites listed below have been assessed.      Areas assessed by both nurses:    Head, Face, Ears, Shoulders, Back, Chest, Arms, Elbows, Hands, Sacrum. Buttock, Coccyx, Ischium, Legs. Feet and Heels, and Under Medical Devices         Does the Patient have a Wound? No noted wound(s)       Alejandro Prevention initiated by RN: No  Wound Care Orders initiated by RN: No    Pressure Injury (Stage 3,4, Unstageable, DTI, NWPT, and Complex wounds) if present, place Wound referral order by RN under : No    New Ostomies, if present place, Ostomy referral order under : No     Nurse 1 eSignature: Electronically signed by Landy Churchill RN on 3/11/25 at 12:18 AM EDT    **SHARE this note so that the co-signing nurse can place an eSignature**    Nurse 2 eSignature: Electronically signed by Sarah Easley RN on 3/11/25 at 4:19 AM EDT

## 2025-03-11 NOTE — PLAN OF CARE
Problem: ABCDS Injury Assessment  Goal: Absence of physical injury  3/11/2025 0755 by Evangelina Dixon, RN  Outcome: Progressing     Problem: Discharge Planning  Goal: Discharge to home or other facility with appropriate resources  3/11/2025 0755 by Evangelina Dixon, RN  Outcome: Progressing     Problem: Safety - Adult  Goal: Free from fall injury  3/11/2025 0755 by Evangelina Dixon, RN  Outcome: Progressing     Problem: Skin/Tissue Integrity  Goal: Skin integrity remains intact  Description: 1.  Monitor for areas of redness and/or skin breakdown  2.  Assess vascular access sites hourly  3.  Every 4-6 hours minimum:  Change oxygen saturation probe site  4.  Every 4-6 hours:  If on nasal continuous positive airway pressure, respiratory therapy assess nares and determine need for appliance change or resting period  3/11/2025 0755 by Evangelina Dixon, RN  Outcome: Progressing

## 2025-03-11 NOTE — PROGRESS NOTES
Physical Therapy  Facility/Department: [address] ORTHO SURGERY  Physical Therapy Initial Assessment    Name: Nury Fraser  : 1960  MRN: [id number]  Date of Service: 3/11/2025        Patient Diagnosis(es): The encounter diagnosis was Nondisplaced fracture of greater trochanter of left femur, initial encounter for closed fracture (HCC).  Past Medical History:  has a past medical history of Alcohol abuse, unspecified, Allergic rhinitis, cause unspecified, Anxiety disorder in conditions classified elsewhere, Anxiety state, unspecified, Atrial fibrillation (HCC), Chronic airway obstruction, not elsewhere classified, Chronic interstitial cystitis, Degeneration of lumbar or lumbosacral intervertebral disc, Depressive disorder, not elsewhere classified, Essential hypertension, benign, Irritable bowel syndrome, Tobacco use disorder, and Unspecified vitamin D deficiency.  Past Surgical History:  has a past surgical history that includes  section; Appendectomy; Breast surgery; Knee arthroscopy; Cholecystectomy; Neck surgery; Cardiac catheterization; and other surgical history (2020).         Requires PT Follow-Up: Yes    Evaluating Therapist: Yasemin Moran PT     Referring Provider:  Marion Agee, DONY - CNP     PT order : PT eval and treat     Room #: 706  DIAGNOSIS: The encounter diagnosis was Nondisplaced fracture of greater trochanter of left femur, initial encounter for closed fracture (HCC).  Non operative   PRECAUTIONS:  falls, PWB 50% L LE     Social:  Pt lives with spouse  in a  1  floor plan  no  steps  to enter.  Prior to admission pt walked with  no AD, independent. Pt reports she falls      Initial Evaluation  Date:  3/11/2025  Treatment      Short Term/ Long Term   Goals   Was pt agreeable to Eval/treatment?  Yes      Does pt have pain?  L hip, R knee      Bed Mobility  Rolling: min assist   Supine to sit:  mod assist   Sit to supine:  mod assist   Scooting:  min assist to EOB    SBA

## 2025-03-12 VITALS
RESPIRATION RATE: 18 BRPM | SYSTOLIC BLOOD PRESSURE: 142 MMHG | BODY MASS INDEX: 27.74 KG/M2 | HEART RATE: 64 BPM | WEIGHT: 162.48 LBS | HEIGHT: 64 IN | OXYGEN SATURATION: 94 % | DIASTOLIC BLOOD PRESSURE: 86 MMHG | TEMPERATURE: 97.3 F

## 2025-03-12 LAB
ANION GAP SERPL CALCULATED.3IONS-SCNC: 11 MMOL/L (ref 7–16)
BASOPHILS # BLD: 0.06 K/UL (ref 0–0.2)
BASOPHILS NFR BLD: 1 % (ref 0–2)
BUN SERPL-MCNC: 6 MG/DL (ref 6–23)
CALCIUM SERPL-MCNC: 8.8 MG/DL (ref 8.6–10.2)
CHLORIDE SERPL-SCNC: 96 MMOL/L (ref 98–107)
CO2 SERPL-SCNC: 26 MMOL/L (ref 22–29)
CREAT SERPL-MCNC: 0.6 MG/DL (ref 0.5–1)
EOSINOPHIL # BLD: 0.43 K/UL (ref 0.05–0.5)
EOSINOPHILS RELATIVE PERCENT: 6 % (ref 0–6)
ERYTHROCYTE [DISTWIDTH] IN BLOOD BY AUTOMATED COUNT: 13.2 % (ref 11.5–15)
GFR, ESTIMATED: >90 ML/MIN/1.73M2
GLUCOSE SERPL-MCNC: 138 MG/DL (ref 74–99)
HCT VFR BLD AUTO: 33.9 % (ref 34–48)
HGB BLD-MCNC: 11.2 G/DL (ref 11.5–15.5)
IMM GRANULOCYTES # BLD AUTO: 0.03 K/UL (ref 0–0.58)
IMM GRANULOCYTES NFR BLD: 0 % (ref 0–5)
LYMPHOCYTES NFR BLD: 1.32 K/UL (ref 1.5–4)
LYMPHOCYTES RELATIVE PERCENT: 18 % (ref 20–42)
MAGNESIUM SERPL-MCNC: 1.7 MG/DL (ref 1.6–2.6)
MCH RBC QN AUTO: 32.8 PG (ref 26–35)
MCHC RBC AUTO-ENTMCNC: 33 G/DL (ref 32–34.5)
MCV RBC AUTO: 99.4 FL (ref 80–99.9)
MONOCYTES NFR BLD: 0.79 K/UL (ref 0.1–0.95)
MONOCYTES NFR BLD: 11 % (ref 2–12)
NEUTROPHILS NFR BLD: 63 % (ref 43–80)
NEUTS SEG NFR BLD: 4.56 K/UL (ref 1.8–7.3)
PHOSPHATE SERPL-MCNC: 3.8 MG/DL (ref 2.5–4.5)
PLATELET # BLD AUTO: 155 K/UL (ref 130–450)
PMV BLD AUTO: 10.3 FL (ref 7–12)
POTASSIUM SERPL-SCNC: 3.6 MMOL/L (ref 3.5–5)
RBC # BLD AUTO: 3.41 M/UL (ref 3.5–5.5)
SODIUM SERPL-SCNC: 133 MMOL/L (ref 132–146)
WBC OTHER # BLD: 7.2 K/UL (ref 4.5–11.5)

## 2025-03-12 PROCEDURE — 6360000002 HC RX W HCPCS: Performed by: NURSE PRACTITIONER

## 2025-03-12 PROCEDURE — 83735 ASSAY OF MAGNESIUM: CPT

## 2025-03-12 PROCEDURE — 84100 ASSAY OF PHOSPHORUS: CPT

## 2025-03-12 PROCEDURE — 85025 COMPLETE CBC W/AUTO DIFF WBC: CPT

## 2025-03-12 PROCEDURE — 97535 SELF CARE MNGMENT TRAINING: CPT

## 2025-03-12 PROCEDURE — 80048 BASIC METABOLIC PNL TOTAL CA: CPT

## 2025-03-12 PROCEDURE — 97530 THERAPEUTIC ACTIVITIES: CPT

## 2025-03-12 PROCEDURE — 99239 HOSP IP/OBS DSCHRG MGMT >30: CPT | Performed by: INTERNAL MEDICINE

## 2025-03-12 PROCEDURE — 2500000003 HC RX 250 WO HCPCS

## 2025-03-12 PROCEDURE — 6370000000 HC RX 637 (ALT 250 FOR IP): Performed by: STUDENT IN AN ORGANIZED HEALTH CARE EDUCATION/TRAINING PROGRAM

## 2025-03-12 PROCEDURE — 6370000000 HC RX 637 (ALT 250 FOR IP)

## 2025-03-12 PROCEDURE — 6360000002 HC RX W HCPCS

## 2025-03-12 RX ORDER — HYDROCODONE BITARTRATE AND ACETAMINOPHEN 5; 325 MG/1; MG/1
1 TABLET ORAL EVERY 6 HOURS PRN
Qty: 10 TABLET | Refills: 0 | Status: SHIPPED | OUTPATIENT
Start: 2025-03-12 | End: 2025-03-15

## 2025-03-12 RX ADMIN — FOLIC ACID 1 MG: 1 TABLET ORAL at 07:50

## 2025-03-12 RX ADMIN — GABAPENTIN 600 MG: 300 CAPSULE ORAL at 13:18

## 2025-03-12 RX ADMIN — MORPHINE SULFATE 2 MG: 2 INJECTION, SOLUTION INTRAMUSCULAR; INTRAVENOUS at 02:25

## 2025-03-12 RX ADMIN — ATOMOXETINE 60 MG: 60 CAPSULE ORAL at 07:51

## 2025-03-12 RX ADMIN — MORPHINE SULFATE 2 MG: 2 INJECTION, SOLUTION INTRAMUSCULAR; INTRAVENOUS at 07:50

## 2025-03-12 RX ADMIN — SODIUM CHLORIDE, PRESERVATIVE FREE 10 ML: 5 INJECTION INTRAVENOUS at 07:51

## 2025-03-12 RX ADMIN — ALPRAZOLAM 0.5 MG: 0.25 TABLET ORAL at 11:26

## 2025-03-12 RX ADMIN — PANTOPRAZOLE SODIUM 40 MG: 40 TABLET, DELAYED RELEASE ORAL at 07:50

## 2025-03-12 RX ADMIN — ASPIRIN 81 MG: 81 TABLET, COATED ORAL at 07:50

## 2025-03-12 RX ADMIN — LOSARTAN POTASSIUM 100 MG: 50 TABLET, FILM COATED ORAL at 07:50

## 2025-03-12 RX ADMIN — PROPAFENONE HYDROCHLORIDE 225 MG: 225 TABLET, FILM COATED ORAL at 13:18

## 2025-03-12 RX ADMIN — KETOROLAC TROMETHAMINE 15 MG: 15 INJECTION, SOLUTION INTRAMUSCULAR; INTRAVENOUS at 11:26

## 2025-03-12 RX ADMIN — PROPAFENONE HYDROCHLORIDE 225 MG: 225 TABLET, FILM COATED ORAL at 07:50

## 2025-03-12 RX ADMIN — MORPHINE SULFATE 2 MG: 2 INJECTION, SOLUTION INTRAMUSCULAR; INTRAVENOUS at 13:19

## 2025-03-12 RX ADMIN — GABAPENTIN 600 MG: 300 CAPSULE ORAL at 07:50

## 2025-03-12 RX ADMIN — VILAZODONE HYDROCHLORIDE 40 MG: 40 TABLET ORAL at 07:50

## 2025-03-12 RX ADMIN — APIXABAN 5 MG: 5 TABLET, FILM COATED ORAL at 07:50

## 2025-03-12 RX ADMIN — ATORVASTATIN CALCIUM 40 MG: 40 TABLET, FILM COATED ORAL at 07:50

## 2025-03-12 RX ADMIN — HYDROCODONE BITARTRATE AND ACETAMINOPHEN 1 TABLET: 5; 325 TABLET ORAL at 06:26

## 2025-03-12 RX ADMIN — METOPROLOL SUCCINATE 100 MG: 100 TABLET, EXTENDED RELEASE ORAL at 07:50

## 2025-03-12 ASSESSMENT — PAIN SCALES - GENERAL
PAINLEVEL_OUTOF10: 2
PAINLEVEL_OUTOF10: 9
PAINLEVEL_OUTOF10: 6
PAINLEVEL_OUTOF10: 2
PAINLEVEL_OUTOF10: 6
PAINLEVEL_OUTOF10: 7
PAINLEVEL_OUTOF10: 9
PAINLEVEL_OUTOF10: 5

## 2025-03-12 ASSESSMENT — PAIN - FUNCTIONAL ASSESSMENT
PAIN_FUNCTIONAL_ASSESSMENT: ACTIVITIES ARE NOT PREVENTED
PAIN_FUNCTIONAL_ASSESSMENT: ACTIVITIES ARE NOT PREVENTED

## 2025-03-12 ASSESSMENT — PAIN DESCRIPTION - DESCRIPTORS
DESCRIPTORS: ACHING;DISCOMFORT
DESCRIPTORS: ACHING;DISCOMFORT;SORE
DESCRIPTORS: ACHING;DISCOMFORT;SORE
DESCRIPTORS: DISCOMFORT;ACHING
DESCRIPTORS: ACHING;DISCOMFORT;SORE

## 2025-03-12 ASSESSMENT — PAIN DESCRIPTION - LOCATION
LOCATION: HIP
LOCATION: LEG;HIP
LOCATION: HIP
LOCATION: LEG;HIP
LOCATION: HIP;LEG

## 2025-03-12 ASSESSMENT — PAIN DESCRIPTION - ORIENTATION
ORIENTATION: RIGHT
ORIENTATION: LEFT

## 2025-03-12 NOTE — PROGRESS NOTES
Physical Therapy  Facility/Department: [address] ORTHO SURGERY  Physical Therapy Treatment Note    Name: Nury Fraser  : 1960  MRN: [id number]  Date of Service: 3/12/2025        Patient Diagnosis(es): The encounter diagnosis was Nondisplaced fracture of greater trochanter of left femur, initial encounter for closed fracture (HCC).  Past Medical History:  has a past medical history of Alcohol abuse, unspecified, Allergic rhinitis, cause unspecified, Anxiety disorder in conditions classified elsewhere, Anxiety state, unspecified, Atrial fibrillation (HCC), Chronic airway obstruction, not elsewhere classified, Chronic interstitial cystitis, Degeneration of lumbar or lumbosacral intervertebral disc, Depressive disorder, not elsewhere classified, Essential hypertension, benign, Irritable bowel syndrome, Tobacco use disorder, and Unspecified vitamin D deficiency.  Past Surgical History:  has a past surgical history that includes  section; Appendectomy; Breast surgery; Knee arthroscopy; Cholecystectomy; Neck surgery; Cardiac catheterization; and other surgical history (2020).              Evaluating Therapist: Yasemin Moran PT     Referring Provider:  Marion Agee APRN - CNP     PT order : PT eval and treat     Room #: 706  DIAGNOSIS: The encounter diagnosis was Nondisplaced fracture of greater trochanter of left femur, initial encounter for closed fracture (HCC).  Non operative   PRECAUTIONS:  falls, PWB 50% L LE     Social:  Pt lives with spouse  in a  1  floor plan  no  steps  to enter.  Prior to admission pt walked with  no AD, independent. Pt reports she falls      Initial Evaluation  Date:  3/11/2025  Treatment  3/12/2025    Short Term/ Long Term   Goals   Was pt agreeable to Eval/treatment?  Yes  yes    Does pt have pain?  L hip, R knee  L hip pain    Bed Mobility  Rolling: min assist   Supine to sit:  mod assist   Sit to supine:  mod assist   Scooting:  min assist to EOB  Supine <>

## 2025-03-12 NOTE — PLAN OF CARE
Problem: ABCDS Injury Assessment  Goal: Absence of physical injury  Outcome: Progressing     Problem: Discharge Planning  Goal: Discharge to home or other facility with appropriate resources  Outcome: Progressing     Problem: Safety - Adult  Goal: Free from fall injury  Outcome: Progressing     Problem: Skin/Tissue Integrity  Goal: Skin integrity remains intact  Description: 1.  Monitor for areas of redness and/or skin breakdown  2.  Assess vascular access sites hourly  3.  Every 4-6 hours minimum:  Change oxygen saturation probe site  4.  Every 4-6 hours:  If on nasal continuous positive airway pressure, respiratory therapy assess nares and determine need for appliance change or resting period  Outcome: Progressing  Flowsheets (Taken 3/11/2025 2047 by Maryann Duvall RN)  Skin Integrity Remains Intact: Monitor for areas of redness and/or skin breakdown     Problem: Pain  Goal: Verbalizes/displays adequate comfort level or baseline comfort level  Outcome: Progressing

## 2025-03-12 NOTE — DISCHARGE SUMMARY
[facility] Hospitalist       Hospitalist Physician Discharge Summary       Schenker, Corinne L, APRN - CNP  [address] [facility] [address] [phone]    Follow up in 2 week(s)        Activity level: as payton    Diet: ADULT DIET; Regular; Low Fat/Low Chol/High Fiber/2 gm Na    Dispo:home    Condition at discharge: fair      Patient ID:  Nury Fraser  [id number]  64 y.o.  1960    Admit date: 3/10/2025    Discharge date and time:  3/12/2025  12:22 PM    Admission Diagnoses: Principal Problem:    Displaced fracture of greater trochanter of left femur, initial encounter for closed fracture (HCC)  Active Problems:    COPD (chronic obstructive pulmonary disease) (HCC)    Essential hypertension, benign    Tobacco use disorder    Alcohol abuse    Neuropathy    Atrial fibrillation (HCC)    Marijuana use    Nondisplaced fracture of greater trochanter of left femur, initial encounter for closed fracture (HCC)    Ambulatory dysfunction    Closed fracture of femur (HCC)    Hypomagnesemia  Resolved Problems:    * No resolved hospital problems. *      Discharge Diagnoses: Principal Problem:    Displaced fracture of greater trochanter of left femur, initial encounter for closed fracture (HCC)  Active Problems:    COPD (chronic obstructive pulmonary disease) (HCC)    Essential hypertension, benign    Tobacco use disorder    Alcohol abuse    Neuropathy    Atrial fibrillation (HCC)    Marijuana use    Nondisplaced fracture of greater trochanter of left femur, initial encounter for closed fracture (HCC)    Ambulatory dysfunction    Closed fracture of femur (HCC)    Hypomagnesemia  Resolved Problems:    * No resolved hospital problems. *    Left greater trochanteric fx  Hypomagnesemia   Leukocytosis  Copd  atrial fib   htn      Consults:  IP CONSULT TO ORTHOPEDIC SURGERY  IP CONSULT TO INTERNAL MEDICINE  IP CONSULT TO SOCIAL WORK    Procedures: none    Hospital Course: Patient was admitted with Nondisplaced

## 2025-03-12 NOTE — PROGRESS NOTES
P Quality Flow/Interdisciplinary Rounds Progress Note        Quality Flow Rounds held on March 12, 2025    Disciplines Attending:  Bedside Nurse, , , Nursing Unit Leadership, and      Nury Barkeraver was admitted on 3/10/2025  5:54 AM    Anticipated Discharge Date:   3/13/2025    Disposition: Home with home health    Alejandro Score:  Alejandro Scale Score: 18    [facility] RISK OF UNPLANNED READMISSION 2.0             10 Total Score        Discussed patient goal for the day, patient clinical progression, and barriers to discharge.  The following Goal(s) of the Day/Commitment(s) have been identified:  Pain Management         Jessica Sebastian RN  March 12, 2025

## 2025-03-12 NOTE — PROGRESS NOTES
Occupational Therapy  OT BEDSIDE TREATMENT NOTE      Date:3/12/2025  Patient Name: Nury Fraser  MRN: [id number]  : 1960  Room: [address] [place], [place] [address]      Evaluating OT: Ivet Prater OTR/L   [id number]       Referring Provider:Marion Agee APRN - CNP     Specific Provider Orders/Date:OT eval and treat        Diagnosis:  Nondisplaced fracture of greater trochanter of left femur, initial encounter for closed fracture (HCC) [S72.115A]  Displaced fracture of greater trochanter of left femur, initial encounter for closed fracture (HCC) [S72.112A]    Per ortho note:     ASSESSMENT:    left greater trochanter fx  PLAN:   PWB 50% and walker for closed treatment of left greater troch fx  Pain control  No plan for surgery at this time    Pertinent Medical History: alcohol abuse, A fib, anxiety, IBS, ankle surgery     Precautions:  Fall Risk, PWB L LE       Assessment of current deficits    [x] Functional mobility            []ADLs           [x] Strength                  []Cognition    [x] Functional transfers          [x] IADLs         [x] Safety Awareness   [x]Endurance    [] Fine Coordination                         [x] Balance      [] Vision/perception   []Sensation      []Gross Motor Coordination             [] ROM           [] Delirium                   [] Motor Control      OT PLAN OF CARE   OT POC based on physician orders, patient diagnosis and results of clinical assessment     Frequency/Duration  2-4 days/wk for 2 - 4weeks PRN   Specific OT Treatment Interventions to include:   ADL retraining/adapted techniques and AE recommendations to increase functional independence within precautions                    Energy conservation techniques to improve tolerance for selfcare routine   Functional transfer/mobility training/DME recommendations for increased independence, safety and fall prevention         Patient/family education to increase safety and functional independence             Environmental modifications

## 2025-03-12 NOTE — PROGRESS NOTES
Walker and bsc delivered to room with pt and .  Verbal and written discharge instructions reviewed with pt and , voiced understanding.

## 2025-03-12 NOTE — PLAN OF CARE
Problem: ABCDS Injury Assessment  Goal: Absence of physical injury  Outcome: Adequate for Discharge     Problem: Discharge Planning  Goal: Discharge to home or other facility with appropriate resources  Outcome: Adequate for Discharge     Problem: Safety - Adult  Goal: Free from fall injury  Outcome: Adequate for Discharge     Problem: Skin/Tissue Integrity  Goal: Skin integrity remains intact  Outcome: Adequate for Discharge  Flowsheets (Taken 3/12/2025 0800)  Skin Integrity Remains Intact: Monitor for areas of redness and/or skin breakdown     Problem: Pain  Goal: Verbalizes/displays adequate comfort level or baseline comfort level  Outcome: Adequate for Discharge

## 2025-03-12 NOTE — CARE COORDINATION
3/12/2025  Social Work Discharge Planning:Sw revisited Pt and plan continues to be home with ww and BSC via [employer]-call them when Pt discharges. Pt would now like [facility]. SW made referral to [facility]. They are following. [facility] order is in.Electronically signed by WILI Ricketts on 3/12/2025 at 12:16 PM  '

## 2025-08-05 ENCOUNTER — HOSPITAL ENCOUNTER (INPATIENT)
Age: 65
LOS: 3 days | Discharge: NURSING, REHAB OR LONG-TERM CARE | DRG: 480 | End: 2025-08-08
Attending: EMERGENCY MEDICINE | Admitting: HOSPITALIST
Payer: MEDICARE

## 2025-08-05 ENCOUNTER — ANESTHESIA EVENT (OUTPATIENT)
Dept: OPERATING ROOM | Age: 65
DRG: 480 | End: 2025-08-05
Payer: MEDICARE

## 2025-08-05 ENCOUNTER — ANESTHESIA (OUTPATIENT)
Dept: OPERATING ROOM | Age: 65
DRG: 480 | End: 2025-08-05
Payer: MEDICARE

## 2025-08-05 ENCOUNTER — APPOINTMENT (OUTPATIENT)
Dept: CT IMAGING | Age: 65
DRG: 480 | End: 2025-08-05
Payer: MEDICARE

## 2025-08-05 ENCOUNTER — APPOINTMENT (OUTPATIENT)
Age: 65
DRG: 480 | End: 2025-08-05
Attending: INTERNAL MEDICINE
Payer: MEDICARE

## 2025-08-05 ENCOUNTER — APPOINTMENT (OUTPATIENT)
Dept: GENERAL RADIOLOGY | Age: 65
DRG: 480 | End: 2025-08-05
Payer: MEDICARE

## 2025-08-05 DIAGNOSIS — R06.02 SHORTNESS OF BREATH: ICD-10-CM
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
## 2025-08-05 DIAGNOSIS — G62.9 NEUROPATHY: Chronic | ICD-10-CM

## 2025-08-05 DIAGNOSIS — S72.141A CLOSED 2-PART INTERTROCHANTERIC FRACTURE OF RIGHT FEMUR, INITIAL ENCOUNTER (HCC): ICD-10-CM

## 2025-08-05 PROBLEM — S22.089A T11 VERTEBRAL FRACTURE (HCC): Chronic | Status: ACTIVE | Noted: 2025-08-05

## 2025-08-05 PROBLEM — S22.089A T11 VERTEBRAL FRACTURE (HCC): Status: ACTIVE | Noted: 2025-08-05

## 2025-08-05 PROBLEM — I25.10 CORONARY ARTERY DISEASE INVOLVING NATIVE CORONARY ARTERY OF NATIVE HEART WITHOUT ANGINA PECTORIS: Status: ACTIVE | Noted: 2025-08-05

## 2025-08-05 LAB
AMPHET UR QL SCN: NEGATIVE
ANION GAP SERPL CALCULATED.3IONS-SCNC: 15 MMOL/L (ref 7–16)
BARBITURATES UR QL SCN: NEGATIVE
BASOPHILS # BLD: 0.12 K/UL (ref 0–0.2)
BASOPHILS NFR BLD: 1 % (ref 0–2)
BENZODIAZ UR QL: POSITIVE
BUN SERPL-MCNC: 4 MG/DL (ref 8–23)
BUPRENORPHINE UR QL: NEGATIVE
CALCIUM SERPL-MCNC: 8.2 MG/DL (ref 8.8–10.2)
CANNABINOIDS UR QL SCN: NEGATIVE
CHLORIDE SERPL-SCNC: 93 MMOL/L (ref 98–107)
CHOLEST SERPL-MCNC: 153 MG/DL
CO2 SERPL-SCNC: 23 MMOL/L (ref 22–29)
COCAINE UR QL SCN: NEGATIVE
CREAT SERPL-MCNC: 0.5 MG/DL (ref 0.5–1)
ECHO AO ROOT DIAM: 3.2 CM
ECHO AO ROOT INDEX: 1.75 CM/M2
ECHO AO SINUS VALSALVA DIAM: 3.6 CM
ECHO AO SINUS VALSALVA INDEX: 1.97 CM/M2
ECHO AV AREA PEAK VELOCITY: 2.2 CM2
ECHO AV AREA VTI: 2.9 CM2
ECHO AV AREA/BSA PEAK VELOCITY: 1.2 CM2/M2
ECHO AV AREA/BSA VTI: 1.6 CM2/M2
ECHO AV CUSP MM: 1.5 CM
ECHO AV MEAN GRADIENT: 4 MMHG
ECHO AV MEAN VELOCITY: 0.9 M/S
ECHO AV PEAK GRADIENT: 9 MMHG
ECHO AV PEAK VELOCITY: 1.5 M/S
ECHO AV VELOCITY RATIO: 0.67
ECHO AV VTI: 17.5 CM
ECHO BSA: 1.87 M2
ECHO EST RA PRESSURE: 3 MMHG
ECHO LA DIAMETER INDEX: 1.69 CM/M2
ECHO LA DIAMETER: 3.1 CM
ECHO LA TO AORTIC ROOT RATIO: 0.97
ECHO LA VOL A-L A2C: 52 ML (ref 22–52)
ECHO LA VOL A-L A4C: 38 ML (ref 22–52)
ECHO LA VOL MOD A2C: 50 ML (ref 22–52)
ECHO LA VOL MOD A4C: 36 ML (ref 22–52)
ECHO LA VOLUME AREA LENGTH: 47 ML
ECHO LA VOLUME INDEX A-L A2C: 28 ML/M2 (ref 16–34)
ECHO LA VOLUME INDEX A-L A4C: 21 ML/M2 (ref 16–34)
ECHO LA VOLUME INDEX AREA LENGTH: 26 ML/M2 (ref 16–34)
ECHO LA VOLUME INDEX MOD A2C: 27 ML/M2 (ref 16–34)
ECHO LA VOLUME INDEX MOD A4C: 20 ML/M2 (ref 16–34)
ECHO LV E' LATERAL VELOCITY: 8 CM/S
ECHO LV E' SEPTAL VELOCITY: 5 CM/S
ECHO LV EDV A2C: 54 ML
ECHO LV EDV A4C: 72 ML
ECHO LV EDV BP: 64 ML (ref 56–104)
ECHO LV EDV INDEX A4C: 39 ML/M2
ECHO LV EDV INDEX BP: 35 ML/M2
ECHO LV EDV NDEX A2C: 30 ML/M2
ECHO LV EF PHYSICIAN: 74 %
ECHO LV EJECTION FRACTION A2C: 73 %
ECHO LV EJECTION FRACTION A4C: 79 %
ECHO LV EJECTION FRACTION BIPLANE: 76 % (ref 55–100)
ECHO LV ESV A2C: 15 ML
ECHO LV ESV A4C: 15 ML
ECHO LV ESV BP: 15 ML (ref 19–49)
ECHO LV ESV INDEX A2C: 8 ML/M2
ECHO LV ESV INDEX A4C: 8 ML/M2
ECHO LV ESV INDEX BP: 8 ML/M2
ECHO LV FRACTIONAL SHORTENING: 42 % (ref 28–44)
ECHO LV INTERNAL DIMENSION DIASTOLE INDEX: 1.8 CM/M2
ECHO LV INTERNAL DIMENSION DIASTOLIC: 3.3 CM (ref 3.9–5.3)
ECHO LV INTERNAL DIMENSION SYSTOLIC INDEX: 1.04 CM/M2
ECHO LV INTERNAL DIMENSION SYSTOLIC: 1.9 CM
ECHO LV ISOVOLUMETRIC RELAXATION TIME (IVRT): 135 MS
ECHO LV IVSD: 1.2 CM (ref 0.6–0.9)
ECHO LV IVSS: 1.4 CM
ECHO LV MASS 2D: 124.8 G (ref 67–162)
ECHO LV MASS INDEX 2D: 68.2 G/M2 (ref 43–95)
ECHO LV POSTERIOR WALL DIASTOLIC: 1.2 CM (ref 0.6–0.9)
ECHO LV POSTERIOR WALL SYSTOLIC: 1.3 CM
ECHO LV RELATIVE WALL THICKNESS RATIO: 0.73
ECHO LVOT AREA: 3.1 CM2
ECHO LVOT AV VTI INDEX: 0.9
ECHO LVOT DIAM: 2 CM
ECHO LVOT MEAN GRADIENT: 2 MMHG
ECHO LVOT PEAK GRADIENT: 4 MMHG
ECHO LVOT PEAK VELOCITY: 1 M/S
ECHO LVOT STROKE VOLUME INDEX: 27.1 ML/M2
ECHO LVOT SV: 49.6 ML
ECHO LVOT VTI: 15.8 CM
ECHO MV "A" WAVE DURATION: 90 MSEC
ECHO MV A VELOCITY: 1 M/S
ECHO MV AREA PHT: 3.9 CM2
ECHO MV AREA VTI: 3.5 CM2
ECHO MV E DECELERATION TIME (DT): 185.3 MS
ECHO MV E VELOCITY: 0.53 M/S
ECHO MV E/A RATIO: 0.53
ECHO MV E/E' LATERAL: 6.63
ECHO MV E/E' RATIO (AVERAGED): 8.61
ECHO MV E/E' SEPTAL: 10.6
ECHO MV LVOT VTI INDEX: 0.9
ECHO MV MAX VELOCITY: 1.1 M/S
ECHO MV MEAN GRADIENT: 2 MMHG
ECHO MV MEAN VELOCITY: 0.7 M/S
ECHO MV PEAK GRADIENT: 5 MMHG
ECHO MV PRESSURE HALF TIME (PHT): 56.4 MS
ECHO MV VTI: 14.2 CM
ECHO RV INTERNAL DIMENSION: 2.6 CM
ECHO RV TAPSE: 1.8 CM (ref 1.7–?)
EKG ATRIAL RATE: 74 BPM
EKG ATRIAL RATE: 91 BPM
EKG P AXIS: 25 DEGREES
EKG P AXIS: 39 DEGREES
EKG P-R INTERVAL: 116 MS
EKG P-R INTERVAL: 126 MS
EKG Q-T INTERVAL: 364 MS
EKG Q-T INTERVAL: 432 MS
EKG QRS DURATION: 74 MS
EKG QRS DURATION: 88 MS
EKG QTC CALCULATION (BAZETT): 447 MS
EKG QTC CALCULATION (BAZETT): 479 MS
EKG R AXIS: 42 DEGREES
EKG R AXIS: 55 DEGREES
EKG T AXIS: 55 DEGREES
EKG T AXIS: 61 DEGREES
EKG VENTRICULAR RATE: 74 BPM
EKG VENTRICULAR RATE: 91 BPM
EOSINOPHIL # BLD: 0.23 K/UL (ref 0.05–0.5)
EOSINOPHILS RELATIVE PERCENT: 1 % (ref 0–6)
ERYTHROCYTE [DISTWIDTH] IN BLOOD BY AUTOMATED COUNT: 13.2 % (ref 11.5–15)
ETHANOLAMINE SERPL-MCNC: 42 MG/DL (ref 0–0.08)
FENTANYL UR QL: NEGATIVE
GFR, ESTIMATED: >90 ML/MIN/1.73M2
GLUCOSE SERPL-MCNC: 111 MG/DL (ref 74–99)
HBA1C MFR BLD: 5.1 % (ref 4–5.6)
HCT VFR BLD AUTO: 31.1 % (ref 34–48)
HDLC SERPL-MCNC: 81 MG/DL
HGB BLD-MCNC: 10.8 G/DL (ref 11.5–15.5)
IMM GRANULOCYTES # BLD AUTO: 0.15 K/UL (ref 0–0.58)
IMM GRANULOCYTES NFR BLD: 1 % (ref 0–5)
INR PPP: 1.1
LDLC SERPL CALC-MCNC: 52 MG/DL
LYMPHOCYTES NFR BLD: 1.57 K/UL (ref 1.5–4)
LYMPHOCYTES RELATIVE PERCENT: 10 % (ref 20–42)
MAGNESIUM SERPL-MCNC: 1.2 MG/DL (ref 1.6–2.4)
MCH RBC QN AUTO: 33.1 PG (ref 26–35)
MCHC RBC AUTO-ENTMCNC: 34.7 G/DL (ref 32–34.5)
MCV RBC AUTO: 95.4 FL (ref 80–99.9)
METHADONE UR QL: NEGATIVE
MONOCYTES NFR BLD: 1.11 K/UL (ref 0.1–0.95)
MONOCYTES NFR BLD: 7 % (ref 2–12)
NEUTROPHILS NFR BLD: 80 % (ref 43–80)
NEUTS SEG NFR BLD: 12.73 K/UL (ref 1.8–7.3)
OPIATES UR QL SCN: POSITIVE
OXYCODONE UR QL SCN: NEGATIVE
PCP UR QL SCN: NEGATIVE
PHOSPHATE SERPL-MCNC: 4 MG/DL (ref 2.5–4.5)
PLATELET # BLD AUTO: 238 K/UL (ref 130–450)
PMV BLD AUTO: 10.3 FL (ref 7–12)
POTASSIUM SERPL-SCNC: 3.7 MMOL/L (ref 3.5–5.1)
PROTHROMBIN TIME: 11.8 SEC (ref 9.3–12.4)
RBC # BLD AUTO: 3.26 M/UL (ref 3.5–5.5)
SODIUM SERPL-SCNC: 130 MMOL/L (ref 136–145)
T4 FREE SERPL-MCNC: 1.8 NG/DL (ref 0.9–1.7)
TEST INFORMATION: ABNORMAL
TRIGL SERPL-MCNC: 98 MG/DL
TSH SERPL DL<=0.05 MIU/L-ACNC: 2.35 UIU/ML (ref 0.27–4.2)
VLDLC SERPL CALC-MCNC: 20 MG/DL
WBC OTHER # BLD: 15.9 K/UL (ref 4.5–11.5)

## 2025-08-05 PROCEDURE — 2580000003 HC RX 258: Performed by: NURSE ANESTHETIST, CERTIFIED REGISTERED

## 2025-08-05 PROCEDURE — 80048 BASIC METABOLIC PNL TOTAL CA: CPT

## 2025-08-05 PROCEDURE — 6360000002 HC RX W HCPCS: Performed by: ORTHOPAEDIC SURGERY

## 2025-08-05 PROCEDURE — 1200000000 HC SEMI PRIVATE

## 2025-08-05 PROCEDURE — 84439 ASSAY OF FREE THYROXINE: CPT

## 2025-08-05 PROCEDURE — 6360000002 HC RX W HCPCS: Performed by: NURSE PRACTITIONER

## 2025-08-05 PROCEDURE — 2500000003 HC RX 250 WO HCPCS: Performed by: NURSE ANESTHETIST, CERTIFIED REGISTERED

## 2025-08-05 PROCEDURE — 6370000000 HC RX 637 (ALT 250 FOR IP): Performed by: ORTHOPAEDIC SURGERY

## 2025-08-05 PROCEDURE — 3600000003 HC SURGERY LEVEL 3 BASE: Performed by: ORTHOPAEDIC SURGERY

## 2025-08-05 PROCEDURE — 7100000001 HC PACU RECOVERY - ADDTL 15 MIN: Performed by: ORTHOPAEDIC SURGERY

## 2025-08-05 PROCEDURE — 6360000002 HC RX W HCPCS: Performed by: NURSE ANESTHETIST, CERTIFIED REGISTERED

## 2025-08-05 PROCEDURE — APPSS180 APP SPLIT SHARED TIME > 60 MINUTES: Performed by: NURSE PRACTITIONER

## 2025-08-05 PROCEDURE — 83735 ASSAY OF MAGNESIUM: CPT

## 2025-08-05 PROCEDURE — 85610 PROTHROMBIN TIME: CPT

## 2025-08-05 PROCEDURE — C1713 ANCHOR/SCREW BN/BN,TIS/BN: HCPCS | Performed by: ORTHOPAEDIC SURGERY

## 2025-08-05 PROCEDURE — 85025 COMPLETE CBC W/AUTO DIFF WBC: CPT

## 2025-08-05 PROCEDURE — 3700000000 HC ANESTHESIA ATTENDED CARE: Performed by: ORTHOPAEDIC SURGERY

## 2025-08-05 PROCEDURE — 6360000004 HC RX CONTRAST MEDICATION: Performed by: INTERNAL MEDICINE

## 2025-08-05 PROCEDURE — 93010 ELECTROCARDIOGRAM REPORT: CPT | Performed by: INTERNAL MEDICINE

## 2025-08-05 PROCEDURE — 6370000000 HC RX 637 (ALT 250 FOR IP): Performed by: HOSPITALIST

## 2025-08-05 PROCEDURE — 2580000003 HC RX 258: Performed by: ORTHOPAEDIC SURGERY

## 2025-08-05 PROCEDURE — 70450 CT HEAD/BRAIN W/O DYE: CPT

## 2025-08-05 PROCEDURE — 6360000004 HC RX CONTRAST MEDICATION: Performed by: RADIOLOGY

## 2025-08-05 PROCEDURE — 72125 CT NECK SPINE W/O DYE: CPT

## 2025-08-05 PROCEDURE — 71045 X-RAY EXAM CHEST 1 VIEW: CPT

## 2025-08-05 PROCEDURE — 3600000013 HC SURGERY LEVEL 3 ADDTL 15MIN: Performed by: ORTHOPAEDIC SURGERY

## 2025-08-05 PROCEDURE — 0QS606Z REPOSITION RIGHT UPPER FEMUR WITH INTRAMEDULLARY INTERNAL FIXATION DEVICE, OPEN APPROACH: ICD-10-PCS | Performed by: ORTHOPAEDIC SURGERY

## 2025-08-05 PROCEDURE — 3700000001 HC ADD 15 MINUTES (ANESTHESIA): Performed by: ORTHOPAEDIC SURGERY

## 2025-08-05 PROCEDURE — 6360000002 HC RX W HCPCS: Performed by: STUDENT IN AN ORGANIZED HEALTH CARE EDUCATION/TRAINING PROGRAM

## 2025-08-05 PROCEDURE — 93005 ELECTROCARDIOGRAM TRACING: CPT | Performed by: EMERGENCY MEDICINE

## 2025-08-05 PROCEDURE — 80307 DRUG TEST PRSMV CHEM ANLYZR: CPT

## 2025-08-05 PROCEDURE — 80061 LIPID PANEL: CPT

## 2025-08-05 PROCEDURE — 96375 TX/PRO/DX INJ NEW DRUG ADDON: CPT

## 2025-08-05 PROCEDURE — 83036 HEMOGLOBIN GLYCOSYLATED A1C: CPT

## 2025-08-05 PROCEDURE — 74177 CT ABD & PELVIS W/CONTRAST: CPT

## 2025-08-05 PROCEDURE — 84100 ASSAY OF PHOSPHORUS: CPT

## 2025-08-05 PROCEDURE — 2500000003 HC RX 250 WO HCPCS: Performed by: ORTHOPAEDIC SURGERY

## 2025-08-05 PROCEDURE — 73090 X-RAY EXAM OF FOREARM: CPT

## 2025-08-05 PROCEDURE — G0480 DRUG TEST DEF 1-7 CLASSES: HCPCS

## 2025-08-05 PROCEDURE — 84443 ASSAY THYROID STIM HORMONE: CPT

## 2025-08-05 PROCEDURE — C8929 TTE W OR WO FOL WCON,DOPPLER: HCPCS

## 2025-08-05 PROCEDURE — 93306 TTE W/DOPPLER COMPLETE: CPT | Performed by: INTERNAL MEDICINE

## 2025-08-05 PROCEDURE — 2709999900 HC NON-CHARGEABLE SUPPLY: Performed by: ORTHOPAEDIC SURGERY

## 2025-08-05 PROCEDURE — 2500000003 HC RX 250 WO HCPCS: Performed by: HOSPITALIST

## 2025-08-05 PROCEDURE — 6360000002 HC RX W HCPCS: Performed by: ANESTHESIOLOGY

## 2025-08-05 PROCEDURE — 99285 EMERGENCY DEPT VISIT HI MDM: CPT

## 2025-08-05 PROCEDURE — 93005 ELECTROCARDIOGRAM TRACING: CPT | Performed by: STUDENT IN AN ORGANIZED HEALTH CARE EDUCATION/TRAINING PROGRAM

## 2025-08-05 PROCEDURE — 7100000000 HC PACU RECOVERY - FIRST 15 MIN: Performed by: ORTHOPAEDIC SURGERY

## 2025-08-05 PROCEDURE — 73552 X-RAY EXAM OF FEMUR 2/>: CPT

## 2025-08-05 PROCEDURE — 96374 THER/PROPH/DIAG INJ IV PUSH: CPT

## 2025-08-05 PROCEDURE — 73502 X-RAY EXAM HIP UNI 2-3 VIEWS: CPT

## 2025-08-05 PROCEDURE — 2720000010 HC SURG SUPPLY STERILE: Performed by: ORTHOPAEDIC SURGERY

## 2025-08-05 PROCEDURE — 6360000002 HC RX W HCPCS: Performed by: EMERGENCY MEDICINE

## 2025-08-05 PROCEDURE — 99223 1ST HOSP IP/OBS HIGH 75: CPT | Performed by: INTERNAL MEDICINE

## 2025-08-05 PROCEDURE — 71260 CT THORAX DX C+: CPT

## 2025-08-05 DEVICE — KWIRE 3.2X450MM: Type: IMPLANTABLE DEVICE | Site: HIP | Status: FUNCTIONAL

## 2025-08-05 DEVICE — LOCKING SCREW: Type: IMPLANTABLE DEVICE | Site: HIP | Status: FUNCTIONAL

## 2025-08-05 DEVICE — IMPLANTABLE DEVICE: Type: IMPLANTABLE DEVICE | Site: HIP | Status: FUNCTIONAL

## 2025-08-05 RX ORDER — BUPIVACAINE HYDROCHLORIDE 5 MG/ML
INJECTION, SOLUTION EPIDURAL; INTRACAUDAL; PERINEURAL PRN
Status: DISCONTINUED | OUTPATIENT
Start: 2025-08-05 | End: 2025-08-05 | Stop reason: ALTCHOICE

## 2025-08-05 RX ORDER — IOPAMIDOL 755 MG/ML
75 INJECTION, SOLUTION INTRAVASCULAR
Status: COMPLETED | OUTPATIENT
Start: 2025-08-05 | End: 2025-08-05

## 2025-08-05 RX ORDER — LORAZEPAM 2 MG/ML
1 INJECTION INTRAMUSCULAR
Status: DISCONTINUED | OUTPATIENT
Start: 2025-08-05 | End: 2025-08-05 | Stop reason: RX

## 2025-08-05 RX ORDER — MORPHINE SULFATE 2 MG/ML
2 INJECTION, SOLUTION INTRAMUSCULAR; INTRAVENOUS EVERY 4 HOURS PRN
Refills: 0 | Status: DISCONTINUED | OUTPATIENT
Start: 2025-08-05 | End: 2025-08-06

## 2025-08-05 RX ORDER — GLYCOPYRROLATE 0.2 MG/ML
INJECTION INTRAMUSCULAR; INTRAVENOUS
Status: DISCONTINUED | OUTPATIENT
Start: 2025-08-05 | End: 2025-08-05 | Stop reason: SDUPTHER

## 2025-08-05 RX ORDER — FENTANYL CITRATE 50 UG/ML
25 INJECTION, SOLUTION INTRAMUSCULAR; INTRAVENOUS ONCE
Refills: 0 | Status: COMPLETED | OUTPATIENT
Start: 2025-08-05 | End: 2025-08-05

## 2025-08-05 RX ORDER — LORAZEPAM 2 MG/ML
4 INJECTION INTRAMUSCULAR
Status: DISCONTINUED | OUTPATIENT
Start: 2025-08-05 | End: 2025-08-05 | Stop reason: RX

## 2025-08-05 RX ORDER — ATORVASTATIN CALCIUM 40 MG/1
40 TABLET, FILM COATED ORAL DAILY
Status: DISCONTINUED | OUTPATIENT
Start: 2025-08-05 | End: 2025-08-08 | Stop reason: HOSPADM

## 2025-08-05 RX ORDER — LORAZEPAM 1 MG/1
1 TABLET ORAL
Status: DISCONTINUED | OUTPATIENT
Start: 2025-08-05 | End: 2025-08-08 | Stop reason: HOSPADM

## 2025-08-05 RX ORDER — HYDROCODONE BITARTRATE AND ACETAMINOPHEN 5; 325 MG/1; MG/1
1 TABLET ORAL EVERY 4 HOURS PRN
Qty: 40 TABLET | Refills: 0 | Status: SHIPPED | OUTPATIENT
Start: 2025-08-05 | End: 2025-08-12

## 2025-08-05 RX ORDER — MAGNESIUM SULFATE IN WATER 40 MG/ML
2000 INJECTION, SOLUTION INTRAVENOUS ONCE
Status: COMPLETED | OUTPATIENT
Start: 2025-08-05 | End: 2025-08-05

## 2025-08-05 RX ORDER — LIDOCAINE HYDROCHLORIDE 20 MG/ML
INJECTION, SOLUTION EPIDURAL; INFILTRATION; INTRACAUDAL; PERINEURAL
Status: DISCONTINUED | OUTPATIENT
Start: 2025-08-05 | End: 2025-08-05 | Stop reason: SDUPTHER

## 2025-08-05 RX ORDER — ROCURONIUM BROMIDE 10 MG/ML
INJECTION, SOLUTION INTRAVENOUS
Status: DISCONTINUED | OUTPATIENT
Start: 2025-08-05 | End: 2025-08-05 | Stop reason: SDUPTHER

## 2025-08-05 RX ORDER — SODIUM CHLORIDE 0.9 % (FLUSH) 0.9 %
5-40 SYRINGE (ML) INJECTION EVERY 12 HOURS SCHEDULED
Status: DISCONTINUED | OUTPATIENT
Start: 2025-08-05 | End: 2025-08-08 | Stop reason: HOSPADM

## 2025-08-05 RX ORDER — OXYCODONE HYDROCHLORIDE 5 MG/1
5 TABLET ORAL EVERY 4 HOURS PRN
Status: DISCONTINUED | OUTPATIENT
Start: 2025-08-05 | End: 2025-08-08 | Stop reason: HOSPADM

## 2025-08-05 RX ORDER — MEPERIDINE HYDROCHLORIDE 50 MG/ML
12.5 INJECTION INTRAMUSCULAR; INTRAVENOUS; SUBCUTANEOUS EVERY 5 MIN PRN
Status: DISCONTINUED | OUTPATIENT
Start: 2025-08-05 | End: 2025-08-05 | Stop reason: HOSPADM

## 2025-08-05 RX ORDER — NEOSTIGMINE METHYLSULFATE 1 MG/ML
INJECTION, SOLUTION INTRAVENOUS
Status: DISCONTINUED | OUTPATIENT
Start: 2025-08-05 | End: 2025-08-05 | Stop reason: SDUPTHER

## 2025-08-05 RX ORDER — SODIUM CHLORIDE 0.9 % (FLUSH) 0.9 %
5-40 SYRINGE (ML) INJECTION PRN
Status: DISCONTINUED | OUTPATIENT
Start: 2025-08-05 | End: 2025-08-05 | Stop reason: HOSPADM

## 2025-08-05 RX ORDER — LORAZEPAM 1 MG/1
2 TABLET ORAL
Status: DISCONTINUED | OUTPATIENT
Start: 2025-08-05 | End: 2025-08-08 | Stop reason: HOSPADM

## 2025-08-05 RX ORDER — SODIUM CHLORIDE 9 MG/ML
INJECTION, SOLUTION INTRAVENOUS PRN
Status: DISCONTINUED | OUTPATIENT
Start: 2025-08-05 | End: 2025-08-08 | Stop reason: HOSPADM

## 2025-08-05 RX ORDER — PANTOPRAZOLE SODIUM 40 MG/1
40 TABLET, DELAYED RELEASE ORAL
Status: DISCONTINUED | OUTPATIENT
Start: 2025-08-05 | End: 2025-08-08 | Stop reason: HOSPADM

## 2025-08-05 RX ORDER — ALBUTEROL SULFATE 0.83 MG/ML
2.5 SOLUTION RESPIRATORY (INHALATION) EVERY 6 HOURS PRN
Status: DISCONTINUED | OUTPATIENT
Start: 2025-08-05 | End: 2025-08-08 | Stop reason: HOSPADM

## 2025-08-05 RX ORDER — MAGNESIUM SULFATE IN WATER 40 MG/ML
2000 INJECTION, SOLUTION INTRAVENOUS ONCE
Status: DISCONTINUED | OUTPATIENT
Start: 2025-08-05 | End: 2025-08-05

## 2025-08-05 RX ORDER — DIPHENHYDRAMINE HYDROCHLORIDE 50 MG/ML
12.5 INJECTION, SOLUTION INTRAMUSCULAR; INTRAVENOUS
Status: DISCONTINUED | OUTPATIENT
Start: 2025-08-05 | End: 2025-08-05 | Stop reason: HOSPADM

## 2025-08-05 RX ORDER — LORAZEPAM 1 MG/1
4 TABLET ORAL
Status: DISCONTINUED | OUTPATIENT
Start: 2025-08-05 | End: 2025-08-08 | Stop reason: HOSPADM

## 2025-08-05 RX ORDER — METOPROLOL SUCCINATE 50 MG/1
100 TABLET, EXTENDED RELEASE ORAL 2 TIMES DAILY
Status: DISCONTINUED | OUTPATIENT
Start: 2025-08-05 | End: 2025-08-08 | Stop reason: HOSPADM

## 2025-08-05 RX ORDER — MULTIVITAMIN WITH IRON
1 TABLET ORAL DAILY
Status: DISCONTINUED | OUTPATIENT
Start: 2025-08-05 | End: 2025-08-08 | Stop reason: HOSPADM

## 2025-08-05 RX ORDER — LOSARTAN POTASSIUM 50 MG/1
100 TABLET ORAL EVERY MORNING
Status: DISCONTINUED | OUTPATIENT
Start: 2025-08-05 | End: 2025-08-08 | Stop reason: HOSPADM

## 2025-08-05 RX ORDER — SODIUM CHLORIDE 9 MG/ML
INJECTION, SOLUTION INTRAVENOUS
Status: DISCONTINUED | OUTPATIENT
Start: 2025-08-05 | End: 2025-08-05 | Stop reason: SDUPTHER

## 2025-08-05 RX ORDER — SODIUM CHLORIDE 0.9 % (FLUSH) 0.9 %
5-40 SYRINGE (ML) INJECTION PRN
Status: DISCONTINUED | OUTPATIENT
Start: 2025-08-05 | End: 2025-08-08 | Stop reason: HOSPADM

## 2025-08-05 RX ORDER — MORPHINE SULFATE 4 MG/ML
4 INJECTION, SOLUTION INTRAMUSCULAR; INTRAVENOUS ONCE
Refills: 0 | Status: COMPLETED | OUTPATIENT
Start: 2025-08-05 | End: 2025-08-05

## 2025-08-05 RX ORDER — PROPAFENONE HYDROCHLORIDE 225 MG/1
225 TABLET, FILM COATED ORAL 3 TIMES DAILY
Status: DISCONTINUED | OUTPATIENT
Start: 2025-08-05 | End: 2025-08-08 | Stop reason: HOSPADM

## 2025-08-05 RX ORDER — LORAZEPAM 1 MG/1
3 TABLET ORAL
Status: DISCONTINUED | OUTPATIENT
Start: 2025-08-05 | End: 2025-08-08 | Stop reason: HOSPADM

## 2025-08-05 RX ORDER — FOLIC ACID 1 MG/1
1 TABLET ORAL DAILY
Status: DISCONTINUED | OUTPATIENT
Start: 2025-08-05 | End: 2025-08-08 | Stop reason: HOSPADM

## 2025-08-05 RX ORDER — FENTANYL CITRATE 50 UG/ML
INJECTION, SOLUTION INTRAMUSCULAR; INTRAVENOUS
Status: DISCONTINUED | OUTPATIENT
Start: 2025-08-05 | End: 2025-08-05 | Stop reason: SDUPTHER

## 2025-08-05 RX ORDER — DEXAMETHASONE SODIUM PHOSPHATE 4 MG/ML
INJECTION, SOLUTION INTRA-ARTICULAR; INTRALESIONAL; INTRAMUSCULAR; INTRAVENOUS; SOFT TISSUE
Status: DISCONTINUED | OUTPATIENT
Start: 2025-08-05 | End: 2025-08-05 | Stop reason: SDUPTHER

## 2025-08-05 RX ORDER — ONDANSETRON 2 MG/ML
INJECTION INTRAMUSCULAR; INTRAVENOUS
Status: DISCONTINUED | OUTPATIENT
Start: 2025-08-05 | End: 2025-08-05 | Stop reason: SDUPTHER

## 2025-08-05 RX ORDER — LANOLIN ALCOHOL/MO/W.PET/CERES
100 CREAM (GRAM) TOPICAL DAILY
Status: DISCONTINUED | OUTPATIENT
Start: 2025-08-05 | End: 2025-08-08 | Stop reason: HOSPADM

## 2025-08-05 RX ORDER — PROPOFOL 10 MG/ML
INJECTION, EMULSION INTRAVENOUS
Status: DISCONTINUED | OUTPATIENT
Start: 2025-08-05 | End: 2025-08-05 | Stop reason: SDUPTHER

## 2025-08-05 RX ORDER — ATOMOXETINE 60 MG/1
60 CAPSULE ORAL EVERY MORNING
Status: DISCONTINUED | OUTPATIENT
Start: 2025-08-05 | End: 2025-08-08 | Stop reason: HOSPADM

## 2025-08-05 RX ORDER — LORAZEPAM 2 MG/ML
2 INJECTION INTRAMUSCULAR
Status: DISCONTINUED | OUTPATIENT
Start: 2025-08-05 | End: 2025-08-05 | Stop reason: RX

## 2025-08-05 RX ORDER — LORAZEPAM 2 MG/ML
3 INJECTION INTRAMUSCULAR
Status: DISCONTINUED | OUTPATIENT
Start: 2025-08-05 | End: 2025-08-05 | Stop reason: RX

## 2025-08-05 RX ORDER — SODIUM CHLORIDE 9 MG/ML
INJECTION, SOLUTION INTRAVENOUS CONTINUOUS
Status: DISCONTINUED | OUTPATIENT
Start: 2025-08-05 | End: 2025-08-07

## 2025-08-05 RX ORDER — SODIUM CHLORIDE 0.9 % (FLUSH) 0.9 %
5-40 SYRINGE (ML) INJECTION EVERY 12 HOURS SCHEDULED
Status: DISCONTINUED | OUTPATIENT
Start: 2025-08-05 | End: 2025-08-05 | Stop reason: HOSPADM

## 2025-08-05 RX ORDER — SODIUM CHLORIDE 9 MG/ML
INJECTION, SOLUTION INTRAVENOUS PRN
Status: DISCONTINUED | OUTPATIENT
Start: 2025-08-05 | End: 2025-08-05 | Stop reason: HOSPADM

## 2025-08-05 RX ORDER — KETAMINE HYDROCHLORIDE 10 MG/ML
INJECTION, SOLUTION INTRAMUSCULAR; INTRAVENOUS
Status: DISCONTINUED | OUTPATIENT
Start: 2025-08-05 | End: 2025-08-05 | Stop reason: SDUPTHER

## 2025-08-05 RX ADMIN — IOPAMIDOL 75 ML: 755 INJECTION, SOLUTION INTRAVENOUS at 01:59

## 2025-08-05 RX ADMIN — PROPAFENONE HYDROCHLORIDE 225 MG: 225 TABLET, FILM COATED ORAL at 22:59

## 2025-08-05 RX ADMIN — MORPHINE SULFATE 2 MG: 2 INJECTION, SOLUTION INTRAMUSCULAR; INTRAVENOUS at 05:28

## 2025-08-05 RX ADMIN — FENTANYL CITRATE 25 MCG: 50 INJECTION INTRAMUSCULAR; INTRAVENOUS at 01:06

## 2025-08-05 RX ADMIN — SODIUM CHLORIDE: 9 INJECTION, SOLUTION INTRAVENOUS at 17:06

## 2025-08-05 RX ADMIN — ONDANSETRON 4 MG: 2 INJECTION, SOLUTION INTRAMUSCULAR; INTRAVENOUS at 17:52

## 2025-08-05 RX ADMIN — FENTANYL CITRATE 100 MCG: 50 INJECTION, SOLUTION INTRAMUSCULAR; INTRAVENOUS at 17:06

## 2025-08-05 RX ADMIN — Medication 3 MG: at 17:59

## 2025-08-05 RX ADMIN — DEXAMETHASONE SODIUM PHOSPHATE 4 MG: 4 INJECTION, SOLUTION INTRAMUSCULAR; INTRAVENOUS at 17:21

## 2025-08-05 RX ADMIN — LORAZEPAM 1 MG: 1 TABLET ORAL at 19:29

## 2025-08-05 RX ADMIN — WATER 2000 MG: 1 INJECTION INTRAMUSCULAR; INTRAVENOUS; SUBCUTANEOUS at 17:15

## 2025-08-05 RX ADMIN — MAGNESIUM SULFATE HEPTAHYDRATE 2000 MG: 40 INJECTION, SOLUTION INTRAVENOUS at 09:15

## 2025-08-05 RX ADMIN — PROPAFENONE HYDROCHLORIDE 225 MG: 225 TABLET, FILM COATED ORAL at 14:22

## 2025-08-05 RX ADMIN — PROPAFENONE HYDROCHLORIDE 225 MG: 225 TABLET, FILM COATED ORAL at 09:05

## 2025-08-05 RX ADMIN — KETAMINE HYDROCHLORIDE 15 MG: 10 INJECTION INTRAMUSCULAR; INTRAVENOUS at 17:06

## 2025-08-05 RX ADMIN — METOPROLOL SUCCINATE 100 MG: 50 TABLET, EXTENDED RELEASE ORAL at 09:05

## 2025-08-05 RX ADMIN — LORAZEPAM 1 MG: 1 TABLET ORAL at 21:47

## 2025-08-05 RX ADMIN — FOLIC ACID 1 MG: 1 TABLET ORAL at 09:06

## 2025-08-05 RX ADMIN — ROCURONIUM BROMIDE 40 MG: 10 INJECTION, SOLUTION INTRAVENOUS at 17:06

## 2025-08-05 RX ADMIN — PROPOFOL 100 MG: 10 INJECTION, EMULSION INTRAVENOUS at 17:06

## 2025-08-05 RX ADMIN — Medication 100 MG: at 09:06

## 2025-08-05 RX ADMIN — MAGNESIUM SULFATE HEPTAHYDRATE 2000 MG: 40 INJECTION, SOLUTION INTRAVENOUS at 10:49

## 2025-08-05 RX ADMIN — HYDROMORPHONE HYDROCHLORIDE 0.5 MG: 1 INJECTION, SOLUTION INTRAMUSCULAR; INTRAVENOUS; SUBCUTANEOUS at 18:27

## 2025-08-05 RX ADMIN — MORPHINE SULFATE 4 MG: 4 INJECTION, SOLUTION INTRAMUSCULAR; INTRAVENOUS at 01:51

## 2025-08-05 RX ADMIN — MULTIVITAMIN TABLET 1 TABLET: TABLET at 09:06

## 2025-08-05 RX ADMIN — OXYCODONE HYDROCHLORIDE 5 MG: 5 TABLET ORAL at 21:47

## 2025-08-05 RX ADMIN — HYDROMORPHONE HYDROCHLORIDE 0.5 MG: 1 INJECTION, SOLUTION INTRAMUSCULAR; INTRAVENOUS; SUBCUTANEOUS at 18:20

## 2025-08-05 RX ADMIN — ATORVASTATIN CALCIUM 40 MG: 40 TABLET, FILM COATED ORAL at 09:06

## 2025-08-05 RX ADMIN — LIDOCAINE HYDROCHLORIDE 80 MG: 20 INJECTION, SOLUTION EPIDURAL; INFILTRATION; INTRACAUDAL; PERINEURAL at 17:06

## 2025-08-05 RX ADMIN — SODIUM CHLORIDE, PRESERVATIVE FREE 10 ML: 5 INJECTION INTRAVENOUS at 09:07

## 2025-08-05 RX ADMIN — SODIUM CHLORIDE: 9 INJECTION, SOLUTION INTRAVENOUS at 19:17

## 2025-08-05 RX ADMIN — PANTOPRAZOLE SODIUM 40 MG: 40 TABLET, DELAYED RELEASE ORAL at 05:33

## 2025-08-05 RX ADMIN — SULFUR HEXAFLUORIDE 2 ML: 60.7; .19; .19 INJECTION, POWDER, LYOPHILIZED, FOR SUSPENSION INTRAVENOUS; INTRAVESICAL at 13:36

## 2025-08-05 RX ADMIN — HYDROMORPHONE HYDROCHLORIDE 1 MG: 1 INJECTION, SOLUTION INTRAMUSCULAR; INTRAVENOUS; SUBCUTANEOUS at 03:13

## 2025-08-05 RX ADMIN — MORPHINE SULFATE 2 MG: 2 INJECTION, SOLUTION INTRAMUSCULAR; INTRAVENOUS at 14:31

## 2025-08-05 RX ADMIN — SODIUM CHLORIDE, PRESERVATIVE FREE 10 ML: 5 INJECTION INTRAVENOUS at 14:32

## 2025-08-05 RX ADMIN — GLYCOPYRROLATE 0.6 MG: 0.2 INJECTION INTRAMUSCULAR; INTRAVENOUS at 17:59

## 2025-08-05 RX ADMIN — MORPHINE SULFATE 2 MG: 2 INJECTION, SOLUTION INTRAMUSCULAR; INTRAVENOUS at 10:40

## 2025-08-05 RX ADMIN — METOPROLOL SUCCINATE 100 MG: 50 TABLET, EXTENDED RELEASE ORAL at 22:11

## 2025-08-05 ASSESSMENT — PAIN SCALES - GENERAL
PAINLEVEL_OUTOF10: 10
PAINLEVEL_OUTOF10: 5
PAINLEVEL_OUTOF10: 10
PAINLEVEL_OUTOF10: 8
PAINLEVEL_OUTOF10: 9
PAINLEVEL_OUTOF10: 9
PAINLEVEL_OUTOF10: 10
PAINLEVEL_OUTOF10: 6
PAINLEVEL_OUTOF10: 10
PAINLEVEL_OUTOF10: 8

## 2025-08-05 ASSESSMENT — PAIN DESCRIPTION - DESCRIPTORS
DESCRIPTORS: ACHING
DESCRIPTORS: DISCOMFORT
DESCRIPTORS: ACHING;DULL;DISCOMFORT
DESCRIPTORS: CRUSHING;HEAVINESS;PRESSURE
DESCRIPTORS: STABBING;ACHING
DESCRIPTORS: ACHING;DISCOMFORT;DULL
DESCRIPTORS: ACHING;DISCOMFORT;SORE
DESCRIPTORS: DISCOMFORT

## 2025-08-05 ASSESSMENT — PAIN DESCRIPTION - LOCATION
LOCATION: HIP
LOCATION: LEG
LOCATION: LEG
LOCATION: HIP;LEG
LOCATION: HIP;LEG;SHOULDER
LOCATION: HIP
LOCATION: HIP;LEG
LOCATION: HIP
LOCATION: HIP;LEG

## 2025-08-05 ASSESSMENT — LIFESTYLE VARIABLES
SMOKING_STATUS: 1
HOW MANY STANDARD DRINKS CONTAINING ALCOHOL DO YOU HAVE ON A TYPICAL DAY: 3 OR 4
HOW OFTEN DO YOU HAVE A DRINK CONTAINING ALCOHOL: 4 OR MORE TIMES A WEEK

## 2025-08-05 ASSESSMENT — PAIN DESCRIPTION - ORIENTATION
ORIENTATION: RIGHT

## 2025-08-05 ASSESSMENT — PAIN DESCRIPTION - PAIN TYPE: TYPE: SURGICAL PAIN

## 2025-08-05 ASSESSMENT — PAIN - FUNCTIONAL ASSESSMENT
PAIN_FUNCTIONAL_ASSESSMENT: PREVENTS OR INTERFERES WITH MANY ACTIVE NOT PASSIVE ACTIVITIES
PAIN_FUNCTIONAL_ASSESSMENT: PREVENTS OR INTERFERES SOME ACTIVE ACTIVITIES AND ADLS
PAIN_FUNCTIONAL_ASSESSMENT: PREVENTS OR INTERFERES SOME ACTIVE ACTIVITIES AND ADLS
PAIN_FUNCTIONAL_ASSESSMENT: 0-10
PAIN_FUNCTIONAL_ASSESSMENT: PREVENTS OR INTERFERES SOME ACTIVE ACTIVITIES AND ADLS
PAIN_FUNCTIONAL_ASSESSMENT: 0-10
PAIN_FUNCTIONAL_ASSESSMENT: PREVENTS OR INTERFERES SOME ACTIVE ACTIVITIES AND ADLS
PAIN_FUNCTIONAL_ASSESSMENT: PREVENTS OR INTERFERES SOME ACTIVE ACTIVITIES AND ADLS

## 2025-08-05 ASSESSMENT — PAIN DESCRIPTION - FREQUENCY: FREQUENCY: INTERMITTENT

## 2025-08-05 ASSESSMENT — PAIN DESCRIPTION - ONSET: ONSET: ON-GOING

## 2025-08-06 LAB
ANION GAP SERPL CALCULATED.3IONS-SCNC: 13 MMOL/L (ref 7–16)
BASOPHILS # BLD: 0.02 K/UL (ref 0–0.2)
BASOPHILS NFR BLD: 0 % (ref 0–2)
BUN SERPL-MCNC: 4 MG/DL (ref 8–23)
CALCIUM SERPL-MCNC: 8.2 MG/DL (ref 8.8–10.2)
CHLORIDE SERPL-SCNC: 94 MMOL/L (ref 98–107)
CO2 SERPL-SCNC: 20 MMOL/L (ref 22–29)
CREAT SERPL-MCNC: 0.5 MG/DL (ref 0.5–1)
EOSINOPHIL # BLD: 0 K/UL (ref 0.05–0.5)
EOSINOPHILS RELATIVE PERCENT: 0 % (ref 0–6)
ERYTHROCYTE [DISTWIDTH] IN BLOOD BY AUTOMATED COUNT: 13.3 % (ref 11.5–15)
GFR, ESTIMATED: >90 ML/MIN/1.73M2
GLUCOSE SERPL-MCNC: 138 MG/DL (ref 74–99)
HCT VFR BLD AUTO: 23 % (ref 34–48)
HGB BLD-MCNC: 8.1 G/DL (ref 11.5–15.5)
IMM GRANULOCYTES # BLD AUTO: 0.05 K/UL (ref 0–0.58)
IMM GRANULOCYTES NFR BLD: 1 % (ref 0–5)
INR PPP: 1
LYMPHOCYTES NFR BLD: 0.75 K/UL (ref 1.5–4)
LYMPHOCYTES RELATIVE PERCENT: 8 % (ref 20–42)
MCH RBC QN AUTO: 33.6 PG (ref 26–35)
MCHC RBC AUTO-ENTMCNC: 35.2 G/DL (ref 32–34.5)
MCV RBC AUTO: 95.4 FL (ref 80–99.9)
MONOCYTES NFR BLD: 1.16 K/UL (ref 0.1–0.95)
MONOCYTES NFR BLD: 12 % (ref 2–12)
NEUTROPHILS NFR BLD: 79 % (ref 43–80)
NEUTS SEG NFR BLD: 7.43 K/UL (ref 1.8–7.3)
PLATELET # BLD AUTO: 157 K/UL (ref 130–450)
PMV BLD AUTO: 10.2 FL (ref 7–12)
POTASSIUM SERPL-SCNC: 4.1 MMOL/L (ref 3.5–5.1)
PROTHROMBIN TIME: 11.2 SEC (ref 9.3–12.4)
RBC # BLD AUTO: 2.41 M/UL (ref 3.5–5.5)
SODIUM SERPL-SCNC: 127 MMOL/L (ref 136–145)
WBC OTHER # BLD: 9.4 K/UL (ref 4.5–11.5)

## 2025-08-06 PROCEDURE — 99232 SBSQ HOSP IP/OBS MODERATE 35: CPT | Performed by: INTERNAL MEDICINE

## 2025-08-06 PROCEDURE — 36415 COLL VENOUS BLD VENIPUNCTURE: CPT

## 2025-08-06 PROCEDURE — 85610 PROTHROMBIN TIME: CPT

## 2025-08-06 PROCEDURE — 6370000000 HC RX 637 (ALT 250 FOR IP): Performed by: ORTHOPAEDIC SURGERY

## 2025-08-06 PROCEDURE — 6360000002 HC RX W HCPCS: Performed by: ORTHOPAEDIC SURGERY

## 2025-08-06 PROCEDURE — 6370000000 HC RX 637 (ALT 250 FOR IP): Performed by: STUDENT IN AN ORGANIZED HEALTH CARE EDUCATION/TRAINING PROGRAM

## 2025-08-06 PROCEDURE — 97165 OT EVAL LOW COMPLEX 30 MIN: CPT

## 2025-08-06 PROCEDURE — 85025 COMPLETE CBC W/AUTO DIFF WBC: CPT

## 2025-08-06 PROCEDURE — 97161 PT EVAL LOW COMPLEX 20 MIN: CPT

## 2025-08-06 PROCEDURE — 99233 SBSQ HOSP IP/OBS HIGH 50: CPT | Performed by: STUDENT IN AN ORGANIZED HEALTH CARE EDUCATION/TRAINING PROGRAM

## 2025-08-06 PROCEDURE — 97530 THERAPEUTIC ACTIVITIES: CPT

## 2025-08-06 PROCEDURE — 80048 BASIC METABOLIC PNL TOTAL CA: CPT

## 2025-08-06 PROCEDURE — 1200000000 HC SEMI PRIVATE

## 2025-08-06 PROCEDURE — 2500000003 HC RX 250 WO HCPCS: Performed by: ORTHOPAEDIC SURGERY

## 2025-08-06 RX ORDER — GABAPENTIN 300 MG/1
600 CAPSULE ORAL 3 TIMES DAILY
Status: DISCONTINUED | OUTPATIENT
Start: 2025-08-06 | End: 2025-08-08 | Stop reason: HOSPADM

## 2025-08-06 RX ORDER — ALPRAZOLAM 0.25 MG
0.5 TABLET ORAL 3 TIMES DAILY PRN
Status: DISCONTINUED | OUTPATIENT
Start: 2025-08-06 | End: 2025-08-08 | Stop reason: HOSPADM

## 2025-08-06 RX ORDER — NICOTINE 21 MG/24HR
1 PATCH, TRANSDERMAL 24 HOURS TRANSDERMAL DAILY
Status: DISCONTINUED | OUTPATIENT
Start: 2025-08-06 | End: 2025-08-08 | Stop reason: HOSPADM

## 2025-08-06 RX ADMIN — FOLIC ACID 1 MG: 1 TABLET ORAL at 08:39

## 2025-08-06 RX ADMIN — PROPAFENONE HYDROCHLORIDE 225 MG: 225 TABLET, FILM COATED ORAL at 20:48

## 2025-08-06 RX ADMIN — ALPRAZOLAM 0.5 MG: 0.25 TABLET ORAL at 10:50

## 2025-08-06 RX ADMIN — METOPROLOL SUCCINATE 100 MG: 50 TABLET, EXTENDED RELEASE ORAL at 20:48

## 2025-08-06 RX ADMIN — HYDROMORPHONE HYDROCHLORIDE 0.25 MG: 1 INJECTION, SOLUTION INTRAMUSCULAR; INTRAVENOUS; SUBCUTANEOUS at 21:59

## 2025-08-06 RX ADMIN — OXYCODONE HYDROCHLORIDE 5 MG: 5 TABLET ORAL at 20:48

## 2025-08-06 RX ADMIN — ATORVASTATIN CALCIUM 40 MG: 40 TABLET, FILM COATED ORAL at 08:39

## 2025-08-06 RX ADMIN — SODIUM CHLORIDE, PRESERVATIVE FREE 10 ML: 5 INJECTION INTRAVENOUS at 19:55

## 2025-08-06 RX ADMIN — OXYCODONE HYDROCHLORIDE 5 MG: 5 TABLET ORAL at 12:50

## 2025-08-06 RX ADMIN — GABAPENTIN 600 MG: 300 CAPSULE ORAL at 14:41

## 2025-08-06 RX ADMIN — MULTIVITAMIN TABLET 1 TABLET: TABLET at 08:39

## 2025-08-06 RX ADMIN — PROPAFENONE HYDROCHLORIDE 225 MG: 225 TABLET, FILM COATED ORAL at 14:41

## 2025-08-06 RX ADMIN — OXYCODONE HYDROCHLORIDE 5 MG: 5 TABLET ORAL at 04:33

## 2025-08-06 RX ADMIN — ALPRAZOLAM 0.5 MG: 0.25 TABLET ORAL at 18:16

## 2025-08-06 RX ADMIN — WATER 2000 MG: 1 INJECTION INTRAMUSCULAR; INTRAVENOUS; SUBCUTANEOUS at 08:39

## 2025-08-06 RX ADMIN — GABAPENTIN 600 MG: 300 CAPSULE ORAL at 10:50

## 2025-08-06 RX ADMIN — OXYCODONE HYDROCHLORIDE 5 MG: 5 TABLET ORAL at 16:50

## 2025-08-06 RX ADMIN — WATER 2000 MG: 1 INJECTION INTRAMUSCULAR; INTRAVENOUS; SUBCUTANEOUS at 01:00

## 2025-08-06 RX ADMIN — SODIUM CHLORIDE, PRESERVATIVE FREE 10 ML: 5 INJECTION INTRAVENOUS at 08:39

## 2025-08-06 RX ADMIN — WATER 2000 MG: 1 INJECTION INTRAMUSCULAR; INTRAVENOUS; SUBCUTANEOUS at 16:50

## 2025-08-06 RX ADMIN — Medication 100 MG: at 08:39

## 2025-08-06 RX ADMIN — PANTOPRAZOLE SODIUM 40 MG: 40 TABLET, DELAYED RELEASE ORAL at 16:51

## 2025-08-06 RX ADMIN — APIXABAN 5 MG: 5 TABLET, FILM COATED ORAL at 20:48

## 2025-08-06 RX ADMIN — METOPROLOL SUCCINATE 100 MG: 50 TABLET, EXTENDED RELEASE ORAL at 08:39

## 2025-08-06 RX ADMIN — GABAPENTIN 600 MG: 300 CAPSULE ORAL at 20:48

## 2025-08-06 RX ADMIN — LORAZEPAM 1 MG: 1 TABLET ORAL at 07:28

## 2025-08-06 RX ADMIN — OXYCODONE HYDROCHLORIDE 5 MG: 5 TABLET ORAL at 08:38

## 2025-08-06 RX ADMIN — PROPAFENONE HYDROCHLORIDE 225 MG: 225 TABLET, FILM COATED ORAL at 08:41

## 2025-08-06 RX ADMIN — PANTOPRAZOLE SODIUM 40 MG: 40 TABLET, DELAYED RELEASE ORAL at 06:29

## 2025-08-06 ASSESSMENT — PAIN DESCRIPTION - DESCRIPTORS
DESCRIPTORS: ACHING;DISCOMFORT
DESCRIPTORS: ACHING;DISCOMFORT;SORE
DESCRIPTORS: ACHING;DISCOMFORT;THROBBING
DESCRIPTORS: ACHING;DISCOMFORT

## 2025-08-06 ASSESSMENT — PAIN DESCRIPTION - LOCATION
LOCATION: HIP
LOCATION: HIP;LEG
LOCATION: HIP
LOCATION: HIP

## 2025-08-06 ASSESSMENT — PAIN DESCRIPTION - PAIN TYPE: TYPE: SURGICAL PAIN

## 2025-08-06 ASSESSMENT — PAIN SCALES - GENERAL
PAINLEVEL_OUTOF10: 5
PAINLEVEL_OUTOF10: 6
PAINLEVEL_OUTOF10: 7
PAINLEVEL_OUTOF10: 6
PAINLEVEL_OUTOF10: 8
PAINLEVEL_OUTOF10: 9
PAINLEVEL_OUTOF10: 7
PAINLEVEL_OUTOF10: 9

## 2025-08-06 ASSESSMENT — PAIN - FUNCTIONAL ASSESSMENT
PAIN_FUNCTIONAL_ASSESSMENT: PREVENTS OR INTERFERES SOME ACTIVE ACTIVITIES AND ADLS
PAIN_FUNCTIONAL_ASSESSMENT: PREVENTS OR INTERFERES WITH MANY ACTIVE NOT PASSIVE ACTIVITIES
PAIN_FUNCTIONAL_ASSESSMENT: PREVENTS OR INTERFERES SOME ACTIVE ACTIVITIES AND ADLS
PAIN_FUNCTIONAL_ASSESSMENT: PREVENTS OR INTERFERES SOME ACTIVE ACTIVITIES AND ADLS

## 2025-08-06 ASSESSMENT — PAIN DESCRIPTION - ORIENTATION
ORIENTATION: RIGHT

## 2025-08-06 ASSESSMENT — PAIN SCALES - WONG BAKER
WONGBAKER_NUMERICALRESPONSE: HURTS WHOLE LOT
WONGBAKER_NUMERICALRESPONSE: HURTS WHOLE LOT
WONGBAKER_NUMERICALRESPONSE: HURTS A LITTLE BIT
WONGBAKER_NUMERICALRESPONSE: HURTS A LITTLE BIT

## 2025-08-06 ASSESSMENT — PAIN DESCRIPTION - FREQUENCY: FREQUENCY: CONTINUOUS

## 2025-08-06 ASSESSMENT — PAIN DESCRIPTION - ONSET: ONSET: ON-GOING

## 2025-08-07 LAB
ANION GAP SERPL CALCULATED.3IONS-SCNC: 12 MMOL/L (ref 7–16)
BASOPHILS # BLD: 0 K/UL (ref 0–0.2)
BASOPHILS NFR BLD: 0 % (ref 0–2)
BUN SERPL-MCNC: 4 MG/DL (ref 8–23)
CALCIUM SERPL-MCNC: 8.3 MG/DL (ref 8.8–10.2)
CHLORIDE SERPL-SCNC: 92 MMOL/L (ref 98–107)
CO2 SERPL-SCNC: 21 MMOL/L (ref 22–29)
CREAT SERPL-MCNC: 0.5 MG/DL (ref 0.5–1)
EKG ATRIAL RATE: 127 BPM
EKG Q-T INTERVAL: 332 MS
EKG QRS DURATION: 90 MS
EKG QTC CALCULATION (BAZETT): 453 MS
EKG R AXIS: 28 DEGREES
EKG T AXIS: -1 DEGREES
EKG VENTRICULAR RATE: 112 BPM
EOSINOPHIL # BLD: 0.16 K/UL (ref 0.05–0.5)
EOSINOPHILS RELATIVE PERCENT: 2 % (ref 0–6)
ERYTHROCYTE [DISTWIDTH] IN BLOOD BY AUTOMATED COUNT: 13.2 % (ref 11.5–15)
GFR, ESTIMATED: >90 ML/MIN/1.73M2
GLUCOSE SERPL-MCNC: 113 MG/DL (ref 74–99)
HCT VFR BLD AUTO: 22.7 % (ref 34–48)
HGB BLD-MCNC: 7.9 G/DL (ref 11.5–15.5)
INR PPP: 1.2
LYMPHOCYTES NFR BLD: 2.32 K/UL (ref 1.5–4)
LYMPHOCYTES RELATIVE PERCENT: 25 % (ref 20–42)
MCH RBC QN AUTO: 33.2 PG (ref 26–35)
MCHC RBC AUTO-ENTMCNC: 34.8 G/DL (ref 32–34.5)
MCV RBC AUTO: 95.4 FL (ref 80–99.9)
MONOCYTES NFR BLD: 1.2 K/UL (ref 0.1–0.95)
MONOCYTES NFR BLD: 13 % (ref 2–12)
NEUTROPHILS NFR BLD: 60 % (ref 43–80)
NEUTS SEG NFR BLD: 5.52 K/UL (ref 1.8–7.3)
PLATELET # BLD AUTO: 193 K/UL (ref 130–450)
PMV BLD AUTO: 10.2 FL (ref 7–12)
POTASSIUM SERPL-SCNC: 3.5 MMOL/L (ref 3.5–5.1)
PROTHROMBIN TIME: 13.4 SEC (ref 9.3–12.4)
RBC # BLD AUTO: 2.38 M/UL (ref 3.5–5.5)
RBC # BLD: ABNORMAL 10*6/UL
SODIUM SERPL-SCNC: 126 MMOL/L (ref 136–145)
WBC OTHER # BLD: 9.2 K/UL (ref 4.5–11.5)

## 2025-08-07 PROCEDURE — 97535 SELF CARE MNGMENT TRAINING: CPT

## 2025-08-07 PROCEDURE — 6370000000 HC RX 637 (ALT 250 FOR IP): Performed by: INTERNAL MEDICINE

## 2025-08-07 PROCEDURE — 2500000003 HC RX 250 WO HCPCS: Performed by: ORTHOPAEDIC SURGERY

## 2025-08-07 PROCEDURE — 85610 PROTHROMBIN TIME: CPT

## 2025-08-07 PROCEDURE — 6370000000 HC RX 637 (ALT 250 FOR IP): Performed by: STUDENT IN AN ORGANIZED HEALTH CARE EDUCATION/TRAINING PROGRAM

## 2025-08-07 PROCEDURE — 1200000000 HC SEMI PRIVATE

## 2025-08-07 PROCEDURE — 6370000000 HC RX 637 (ALT 250 FOR IP): Performed by: ORTHOPAEDIC SURGERY

## 2025-08-07 PROCEDURE — 85025 COMPLETE CBC W/AUTO DIFF WBC: CPT

## 2025-08-07 PROCEDURE — 80048 BASIC METABOLIC PNL TOTAL CA: CPT

## 2025-08-07 PROCEDURE — 6370000000 HC RX 637 (ALT 250 FOR IP): Performed by: NURSE PRACTITIONER

## 2025-08-07 PROCEDURE — HZ2ZZZZ DETOXIFICATION SERVICES FOR SUBSTANCE ABUSE TREATMENT: ICD-10-PCS | Performed by: STUDENT IN AN ORGANIZED HEALTH CARE EDUCATION/TRAINING PROGRAM

## 2025-08-07 PROCEDURE — 97110 THERAPEUTIC EXERCISES: CPT

## 2025-08-07 PROCEDURE — 93005 ELECTROCARDIOGRAM TRACING: CPT | Performed by: STUDENT IN AN ORGANIZED HEALTH CARE EDUCATION/TRAINING PROGRAM

## 2025-08-07 PROCEDURE — 93010 ELECTROCARDIOGRAM REPORT: CPT | Performed by: INTERNAL MEDICINE

## 2025-08-07 PROCEDURE — 6360000002 HC RX W HCPCS: Performed by: ORTHOPAEDIC SURGERY

## 2025-08-07 PROCEDURE — 97530 THERAPEUTIC ACTIVITIES: CPT

## 2025-08-07 PROCEDURE — 36415 COLL VENOUS BLD VENIPUNCTURE: CPT

## 2025-08-07 PROCEDURE — 99232 SBSQ HOSP IP/OBS MODERATE 35: CPT | Performed by: STUDENT IN AN ORGANIZED HEALTH CARE EDUCATION/TRAINING PROGRAM

## 2025-08-07 RX ORDER — SODIUM CHLORIDE 1 G/1
2 TABLET ORAL ONCE
Status: COMPLETED | OUTPATIENT
Start: 2025-08-07 | End: 2025-08-07

## 2025-08-07 RX ORDER — DOCUSATE SODIUM 100 MG/1
100 CAPSULE, LIQUID FILLED ORAL DAILY
Status: DISCONTINUED | OUTPATIENT
Start: 2025-08-07 | End: 2025-08-08

## 2025-08-07 RX ORDER — VILAZODONE HYDROCHLORIDE 40 MG/1
40 TABLET ORAL DAILY
Status: DISCONTINUED | OUTPATIENT
Start: 2025-08-07 | End: 2025-08-08 | Stop reason: HOSPADM

## 2025-08-07 RX ADMIN — HYDROMORPHONE HYDROCHLORIDE 0.5 MG: 1 INJECTION, SOLUTION INTRAMUSCULAR; INTRAVENOUS; SUBCUTANEOUS at 09:36

## 2025-08-07 RX ADMIN — ALPRAZOLAM 0.5 MG: 0.25 TABLET ORAL at 03:01

## 2025-08-07 RX ADMIN — GABAPENTIN 600 MG: 300 CAPSULE ORAL at 14:04

## 2025-08-07 RX ADMIN — SODIUM CHLORIDE, PRESERVATIVE FREE 10 ML: 5 INJECTION INTRAVENOUS at 22:43

## 2025-08-07 RX ADMIN — PROPAFENONE HYDROCHLORIDE 225 MG: 225 TABLET, FILM COATED ORAL at 21:24

## 2025-08-07 RX ADMIN — OXYCODONE HYDROCHLORIDE 5 MG: 5 TABLET ORAL at 21:25

## 2025-08-07 RX ADMIN — SODIUM CHLORIDE, PRESERVATIVE FREE 10 ML: 5 INJECTION INTRAVENOUS at 09:37

## 2025-08-07 RX ADMIN — GABAPENTIN 600 MG: 300 CAPSULE ORAL at 21:24

## 2025-08-07 RX ADMIN — PANTOPRAZOLE SODIUM 40 MG: 40 TABLET, DELAYED RELEASE ORAL at 17:25

## 2025-08-07 RX ADMIN — HYDROMORPHONE HYDROCHLORIDE 0.25 MG: 1 INJECTION, SOLUTION INTRAMUSCULAR; INTRAVENOUS; SUBCUTANEOUS at 04:44

## 2025-08-07 RX ADMIN — MULTIVITAMIN TABLET 1 TABLET: TABLET at 09:35

## 2025-08-07 RX ADMIN — PROPAFENONE HYDROCHLORIDE 225 MG: 225 TABLET, FILM COATED ORAL at 09:35

## 2025-08-07 RX ADMIN — SODIUM CHLORIDE, PRESERVATIVE FREE 10 ML: 5 INJECTION INTRAVENOUS at 19:34

## 2025-08-07 RX ADMIN — OXYCODONE HYDROCHLORIDE 5 MG: 5 TABLET ORAL at 13:03

## 2025-08-07 RX ADMIN — PROPAFENONE HYDROCHLORIDE 225 MG: 225 TABLET, FILM COATED ORAL at 14:04

## 2025-08-07 RX ADMIN — APIXABAN 5 MG: 5 TABLET, FILM COATED ORAL at 09:35

## 2025-08-07 RX ADMIN — HYDROMORPHONE HYDROCHLORIDE 0.5 MG: 1 INJECTION, SOLUTION INTRAMUSCULAR; INTRAVENOUS; SUBCUTANEOUS at 14:04

## 2025-08-07 RX ADMIN — GABAPENTIN 600 MG: 300 CAPSULE ORAL at 09:35

## 2025-08-07 RX ADMIN — METOPROLOL SUCCINATE 100 MG: 50 TABLET, EXTENDED RELEASE ORAL at 21:24

## 2025-08-07 RX ADMIN — ATORVASTATIN CALCIUM 40 MG: 40 TABLET, FILM COATED ORAL at 09:35

## 2025-08-07 RX ADMIN — HYDROMORPHONE HYDROCHLORIDE 0.5 MG: 1 INJECTION, SOLUTION INTRAMUSCULAR; INTRAVENOUS; SUBCUTANEOUS at 22:42

## 2025-08-07 RX ADMIN — Medication 2 G: at 17:25

## 2025-08-07 RX ADMIN — ALPRAZOLAM 0.5 MG: 0.25 TABLET ORAL at 10:58

## 2025-08-07 RX ADMIN — OXYCODONE HYDROCHLORIDE 5 MG: 5 TABLET ORAL at 08:20

## 2025-08-07 RX ADMIN — Medication 100 MG: at 09:35

## 2025-08-07 RX ADMIN — PANTOPRAZOLE SODIUM 40 MG: 40 TABLET, DELAYED RELEASE ORAL at 06:10

## 2025-08-07 RX ADMIN — OXYCODONE HYDROCHLORIDE 5 MG: 5 TABLET ORAL at 03:01

## 2025-08-07 RX ADMIN — HYDROMORPHONE HYDROCHLORIDE 0.5 MG: 1 INJECTION, SOLUTION INTRAMUSCULAR; INTRAVENOUS; SUBCUTANEOUS at 19:34

## 2025-08-07 RX ADMIN — APIXABAN 5 MG: 5 TABLET, FILM COATED ORAL at 21:24

## 2025-08-07 RX ADMIN — FOLIC ACID 1 MG: 1 TABLET ORAL at 09:35

## 2025-08-07 RX ADMIN — METOPROLOL SUCCINATE 100 MG: 50 TABLET, EXTENDED RELEASE ORAL at 09:35

## 2025-08-07 RX ADMIN — OXYCODONE HYDROCHLORIDE 5 MG: 5 TABLET ORAL at 17:25

## 2025-08-07 RX ADMIN — DOCUSATE SODIUM 100 MG: 100 CAPSULE, LIQUID FILLED ORAL at 22:42

## 2025-08-07 RX ADMIN — LOSARTAN POTASSIUM 100 MG: 50 TABLET, FILM COATED ORAL at 09:35

## 2025-08-07 ASSESSMENT — PAIN DESCRIPTION - ORIENTATION
ORIENTATION: LEFT
ORIENTATION: RIGHT

## 2025-08-07 ASSESSMENT — PAIN SCALES - WONG BAKER
WONGBAKER_NUMERICALRESPONSE: HURTS A LITTLE BIT
WONGBAKER_NUMERICALRESPONSE: HURTS LITTLE MORE
WONGBAKER_NUMERICALRESPONSE: HURTS A LITTLE BIT
WONGBAKER_NUMERICALRESPONSE: HURTS LITTLE MORE
WONGBAKER_NUMERICALRESPONSE: HURTS A LITTLE BIT
WONGBAKER_NUMERICALRESPONSE: HURTS WORST
WONGBAKER_NUMERICALRESPONSE: HURTS LITTLE MORE
WONGBAKER_NUMERICALRESPONSE: HURTS A LITTLE BIT

## 2025-08-07 ASSESSMENT — PAIN SCALES - GENERAL
PAINLEVEL_OUTOF10: 9
PAINLEVEL_OUTOF10: 7
PAINLEVEL_OUTOF10: 9
PAINLEVEL_OUTOF10: 10
PAINLEVEL_OUTOF10: 6
PAINLEVEL_OUTOF10: 7
PAINLEVEL_OUTOF10: 6
PAINLEVEL_OUTOF10: 9
PAINLEVEL_OUTOF10: 9
PAINLEVEL_OUTOF10: 10
PAINLEVEL_OUTOF10: 4
PAINLEVEL_OUTOF10: 8
PAINLEVEL_OUTOF10: 7
PAINLEVEL_OUTOF10: 8
PAINLEVEL_OUTOF10: 10
PAINLEVEL_OUTOF10: 9

## 2025-08-07 ASSESSMENT — PAIN DESCRIPTION - DESCRIPTORS
DESCRIPTORS: ACHING;DISCOMFORT;THROBBING
DESCRIPTORS: THROBBING;STABBING
DESCRIPTORS: THROBBING;SHARP
DESCRIPTORS: STABBING;THROBBING
DESCRIPTORS: ACHING;DISCOMFORT;THROBBING;SPASM
DESCRIPTORS: ACHING;DISCOMFORT
DESCRIPTORS: ACHING;DISCOMFORT;SORE
DESCRIPTORS: ACHING;DISCOMFORT
DESCRIPTORS: ACHING;DISCOMFORT;THROBBING

## 2025-08-07 ASSESSMENT — PAIN DESCRIPTION - LOCATION
LOCATION: HIP
LOCATION: LEG

## 2025-08-08 VITALS
DIASTOLIC BLOOD PRESSURE: 75 MMHG | BODY MASS INDEX: 29.02 KG/M2 | HEIGHT: 64 IN | OXYGEN SATURATION: 96 % | WEIGHT: 170 LBS | SYSTOLIC BLOOD PRESSURE: 136 MMHG | RESPIRATION RATE: 16 BRPM | HEART RATE: 82 BPM | TEMPERATURE: 97.8 F

## 2025-08-08 LAB
ANION GAP SERPL CALCULATED.3IONS-SCNC: 13 MMOL/L (ref 7–16)
ANION GAP SERPL CALCULATED.3IONS-SCNC: 15 MMOL/L (ref 7–16)
BASOPHILS # BLD: 0.07 K/UL (ref 0–0.2)
BASOPHILS NFR BLD: 1 % (ref 0–2)
BUN SERPL-MCNC: 4 MG/DL (ref 8–23)
BUN SERPL-MCNC: 5 MG/DL (ref 8–23)
CALCIUM SERPL-MCNC: 8.8 MG/DL (ref 8.8–10.2)
CALCIUM SERPL-MCNC: 9 MG/DL (ref 8.8–10.2)
CHLORIDE SERPL-SCNC: 93 MMOL/L (ref 98–107)
CHLORIDE SERPL-SCNC: 97 MMOL/L (ref 98–107)
CO2 SERPL-SCNC: 21 MMOL/L (ref 22–29)
CO2 SERPL-SCNC: 23 MMOL/L (ref 22–29)
CREAT SERPL-MCNC: 0.5 MG/DL (ref 0.5–1)
CREAT SERPL-MCNC: 0.5 MG/DL (ref 0.5–1)
EOSINOPHIL # BLD: 0.12 K/UL (ref 0.05–0.5)
EOSINOPHILS RELATIVE PERCENT: 1 % (ref 0–6)
ERYTHROCYTE [DISTWIDTH] IN BLOOD BY AUTOMATED COUNT: 13 % (ref 11.5–15)
GFR, ESTIMATED: >90 ML/MIN/1.73M2
GFR, ESTIMATED: >90 ML/MIN/1.73M2
GLUCOSE SERPL-MCNC: 127 MG/DL (ref 74–99)
GLUCOSE SERPL-MCNC: 138 MG/DL (ref 74–99)
HCT VFR BLD AUTO: 22.6 % (ref 34–48)
HGB BLD-MCNC: 7.9 G/DL (ref 11.5–15.5)
IMM GRANULOCYTES # BLD AUTO: 0.13 K/UL (ref 0–0.58)
IMM GRANULOCYTES NFR BLD: 1 % (ref 0–5)
INR PPP: 1.4
LYMPHOCYTES NFR BLD: 1.65 K/UL (ref 1.5–4)
LYMPHOCYTES RELATIVE PERCENT: 15 % (ref 20–42)
MCH RBC QN AUTO: 32.9 PG (ref 26–35)
MCHC RBC AUTO-ENTMCNC: 35 G/DL (ref 32–34.5)
MCV RBC AUTO: 94.2 FL (ref 80–99.9)
MONOCYTES NFR BLD: 1.69 K/UL (ref 0.1–0.95)
MONOCYTES NFR BLD: 15 % (ref 2–12)
NEUTROPHILS NFR BLD: 67 % (ref 43–80)
NEUTS SEG NFR BLD: 7.48 K/UL (ref 1.8–7.3)
PLATELET # BLD AUTO: 264 K/UL (ref 130–450)
PMV BLD AUTO: 10 FL (ref 7–12)
POTASSIUM SERPL-SCNC: 3.5 MMOL/L (ref 3.5–5.1)
POTASSIUM SERPL-SCNC: 3.6 MMOL/L (ref 3.5–5.1)
PROTHROMBIN TIME: 14.6 SEC (ref 9.3–12.4)
RBC # BLD AUTO: 2.4 M/UL (ref 3.5–5.5)
RBC # BLD: ABNORMAL 10*6/UL
SODIUM SERPL-SCNC: 130 MMOL/L (ref 136–145)
SODIUM SERPL-SCNC: 133 MMOL/L (ref 136–145)
WBC OTHER # BLD: 11.1 K/UL (ref 4.5–11.5)

## 2025-08-08 PROCEDURE — 6370000000 HC RX 637 (ALT 250 FOR IP): Performed by: INTERNAL MEDICINE

## 2025-08-08 PROCEDURE — 6370000000 HC RX 637 (ALT 250 FOR IP): Performed by: STUDENT IN AN ORGANIZED HEALTH CARE EDUCATION/TRAINING PROGRAM

## 2025-08-08 PROCEDURE — 85025 COMPLETE CBC W/AUTO DIFF WBC: CPT

## 2025-08-08 PROCEDURE — 6370000000 HC RX 637 (ALT 250 FOR IP): Performed by: ORTHOPAEDIC SURGERY

## 2025-08-08 PROCEDURE — 80048 BASIC METABOLIC PNL TOTAL CA: CPT

## 2025-08-08 PROCEDURE — 85610 PROTHROMBIN TIME: CPT

## 2025-08-08 PROCEDURE — 6370000000 HC RX 637 (ALT 250 FOR IP): Performed by: NURSE PRACTITIONER

## 2025-08-08 PROCEDURE — 97535 SELF CARE MNGMENT TRAINING: CPT

## 2025-08-08 PROCEDURE — 6360000002 HC RX W HCPCS: Performed by: ORTHOPAEDIC SURGERY

## 2025-08-08 PROCEDURE — 99239 HOSP IP/OBS DSCHRG MGMT >30: CPT | Performed by: STUDENT IN AN ORGANIZED HEALTH CARE EDUCATION/TRAINING PROGRAM

## 2025-08-08 PROCEDURE — 2500000003 HC RX 250 WO HCPCS: Performed by: ORTHOPAEDIC SURGERY

## 2025-08-08 RX ORDER — POLYETHYLENE GLYCOL 3350 17 G/17G
17 POWDER, FOR SOLUTION ORAL DAILY
Status: DISCONTINUED | OUTPATIENT
Start: 2025-08-08 | End: 2025-08-08 | Stop reason: HOSPADM

## 2025-08-08 RX ORDER — ONDANSETRON 2 MG/ML
4 INJECTION INTRAMUSCULAR; INTRAVENOUS EVERY 6 HOURS PRN
Status: DISCONTINUED | OUTPATIENT
Start: 2025-08-08 | End: 2025-08-08 | Stop reason: HOSPADM

## 2025-08-08 RX ORDER — DOCUSATE SODIUM 100 MG/1
100 CAPSULE, LIQUID FILLED ORAL 2 TIMES DAILY
Status: DISCONTINUED | OUTPATIENT
Start: 2025-08-08 | End: 2025-08-08 | Stop reason: HOSPADM

## 2025-08-08 RX ORDER — ALPRAZOLAM 0.5 MG
0.5 TABLET ORAL 3 TIMES DAILY PRN
Qty: 3 TABLET | Refills: 0 | Status: SHIPPED | OUTPATIENT
Start: 2025-08-08 | End: 2025-08-09

## 2025-08-08 RX ADMIN — Medication 100 MG: at 09:21

## 2025-08-08 RX ADMIN — VILAZODONE HYDROCHLORIDE 40 MG: 40 TABLET ORAL at 09:06

## 2025-08-08 RX ADMIN — ATORVASTATIN CALCIUM 40 MG: 40 TABLET, FILM COATED ORAL at 09:05

## 2025-08-08 RX ADMIN — FOLIC ACID 1 MG: 1 TABLET ORAL at 09:05

## 2025-08-08 RX ADMIN — HYDROMORPHONE HYDROCHLORIDE 0.5 MG: 1 INJECTION, SOLUTION INTRAMUSCULAR; INTRAVENOUS; SUBCUTANEOUS at 03:23

## 2025-08-08 RX ADMIN — GABAPENTIN 600 MG: 300 CAPSULE ORAL at 09:05

## 2025-08-08 RX ADMIN — OXYCODONE HYDROCHLORIDE 5 MG: 5 TABLET ORAL at 02:18

## 2025-08-08 RX ADMIN — LOSARTAN POTASSIUM 100 MG: 50 TABLET, FILM COATED ORAL at 09:05

## 2025-08-08 RX ADMIN — DOCUSATE SODIUM 100 MG: 100 CAPSULE, LIQUID FILLED ORAL at 09:05

## 2025-08-08 RX ADMIN — OXYCODONE HYDROCHLORIDE 5 MG: 5 TABLET ORAL at 09:07

## 2025-08-08 RX ADMIN — APIXABAN 5 MG: 5 TABLET, FILM COATED ORAL at 09:09

## 2025-08-08 RX ADMIN — SODIUM CHLORIDE, PRESERVATIVE FREE 10 ML: 5 INJECTION INTRAVENOUS at 09:15

## 2025-08-08 RX ADMIN — POLYETHYLENE GLYCOL 3350 17 G: 17 POWDER, FOR SOLUTION ORAL at 10:54

## 2025-08-08 RX ADMIN — PROPAFENONE HYDROCHLORIDE 225 MG: 225 TABLET, FILM COATED ORAL at 09:07

## 2025-08-08 RX ADMIN — METOPROLOL SUCCINATE 100 MG: 50 TABLET, EXTENDED RELEASE ORAL at 09:05

## 2025-08-08 RX ADMIN — PANTOPRAZOLE SODIUM 40 MG: 40 TABLET, DELAYED RELEASE ORAL at 06:48

## 2025-08-08 RX ADMIN — HYDROMORPHONE HYDROCHLORIDE 0.5 MG: 1 INJECTION, SOLUTION INTRAMUSCULAR; INTRAVENOUS; SUBCUTANEOUS at 06:48

## 2025-08-08 RX ADMIN — HYDROMORPHONE HYDROCHLORIDE 0.5 MG: 1 INJECTION, SOLUTION INTRAMUSCULAR; INTRAVENOUS; SUBCUTANEOUS at 10:40

## 2025-08-08 RX ADMIN — MULTIVITAMIN TABLET 1 TABLET: TABLET at 09:09

## 2025-08-08 ASSESSMENT — PAIN DESCRIPTION - ORIENTATION
ORIENTATION: RIGHT
ORIENTATION: RIGHT;LEFT
ORIENTATION: RIGHT
ORIENTATION: RIGHT

## 2025-08-08 ASSESSMENT — PAIN DESCRIPTION - LOCATION
LOCATION: HIP
LOCATION: RIB CAGE
LOCATION: ABDOMEN;LEG

## 2025-08-08 ASSESSMENT — PAIN DESCRIPTION - DESCRIPTORS
DESCRIPTORS: ACHING
DESCRIPTORS: ACHING;THROBBING;SHARP;STABBING

## 2025-08-08 ASSESSMENT — PAIN SCALES - GENERAL
PAINLEVEL_OUTOF10: 9

## (undated) DEVICE — Device

## (undated) DEVICE — TOWEL SURG W17XL27IN BLU COT STD PREWASHED STERILE 6 PER PK

## (undated) DEVICE — PAD PERINL POST FOAM DISPOSABLE FOR AMSCO SURG TBL

## (undated) DEVICE — GOWN SURG XL L47IN BLU FABRICREINFORCED SET IN SL HK LOOP

## (undated) DEVICE — DRESSING BORDERED ADH GZ UNIV GEN USE 4INX10IN AND 2INX8IN

## (undated) DEVICE — BLADE SURG 15 SS STRL CISION LF DISP

## (undated) DEVICE — DRAPE C ARM W41XL74IN UNIV MOB W RUBBERBAND CLP

## (undated) DEVICE — DRAPE ISOLATN PT ST W/POCKET

## (undated) DEVICE — NEEDLE HYPO 23GA L1.5IN TURQ POLYPR HUB S STL THN WALL IM

## (undated) DEVICE — SPONGE GZ W4XL4IN 8 PLY 100% COTTON

## (undated) DEVICE — SOLUTION IRRIG 1000ML 09% SOD CHL USP PIC PLAS CONTAINER

## (undated) DEVICE — BLADE SURG 10 SS STRL CISION LF DISP

## (undated) DEVICE — GLOVE ORANGE PI 7   MSG9070

## (undated) DEVICE — DRESSING BORDERED ADH GZ UNIV GEN USE 8INX4IN AND 6INX2IN

## (undated) DEVICE — SPONGE GZ KERLIX W4.5INXL4.1YD COT 6 PLY OPN WV STRETCHABLE

## (undated) DEVICE — PIN PRECSION TM 9X495 MM

## (undated) DEVICE — APPLICATOR MEDICATED 26 CC SOLUTION HI LT ORNG CHLORAPREP

## (undated) DEVICE — WRAP COMPR W4INXL5YD TAN SELF ADH W/ HND TEAR COBAN

## (undated) DEVICE — ELECTRODE PT RET AD L9FT HI MOIST COND ADH HYDRGEL CORDED

## (undated) DEVICE — MANIFOLD SUCT 4 PRT 2 CANSTR FLTR DISP NEPTUNE 2

## (undated) DEVICE — TRAY SKIN SCRUB W/4 COMPARTMENT

## (undated) DEVICE — SOLUTION IRRIG 1000ML H2O PIC PLAS SHATTERPROOF CONTAINER

## (undated) DEVICE — TUBING SCTION CONN 9/32X10 RIB

## (undated) DEVICE — BANDAGE COMPR W6INXL5YD WHT BGE POLY COT WV E HK LOOP CLSR

## (undated) DEVICE — BIT DRILL LK 4.2X360MM

## (undated) DEVICE — COVER BOOT MED W/ TIE N SKID KNEE HI POLYPR IMPERV COND

## (undated) DEVICE — COVER TBL BLU FOAM PERINL PD

## (undated) DEVICE — BANDAGE COMPR W4INXL5YD WHT BGE POLY COT WV E HK LOOP CLSR

## (undated) DEVICE — GOWN SURG L L43IN BLU FAB REINF SET IN SL HK LOOP CLSR

## (undated) DEVICE — PADDING UNDERCAST W6INXL4YD WYTEX 6 PER BG

## (undated) DEVICE — DRESSING COMP W4XL4IN N ADH PD W2.5XL2.5IN GZ BORDERED ADH

## (undated) DEVICE — DRESSING PETRO W1XL8IN 3% BISMUTH TRIBROMOPHENATE XRFRM

## (undated) DEVICE — TAPE ADH W3INXL10YD WHT COT WVN BK POWERFUL RUB BASE HIGHLY